# Patient Record
Sex: MALE | Race: OTHER
[De-identification: names, ages, dates, MRNs, and addresses within clinical notes are randomized per-mention and may not be internally consistent; named-entity substitution may affect disease eponyms.]

---

## 2018-11-20 PROBLEM — Z00.00 ENCOUNTER FOR PREVENTIVE HEALTH EXAMINATION: Status: ACTIVE | Noted: 2018-11-20

## 2018-11-23 ENCOUNTER — RECORD ABSTRACTING (OUTPATIENT)
Age: 63
End: 2018-11-23

## 2018-11-23 DIAGNOSIS — Z86.39 PERSONAL HISTORY OF OTHER ENDOCRINE, NUTRITIONAL AND METABOLIC DISEASE: ICD-10-CM

## 2018-11-23 DIAGNOSIS — Z86.69 PERSONAL HISTORY OF OTHER DISEASES OF THE NERVOUS SYSTEM AND SENSE ORGANS: ICD-10-CM

## 2018-11-23 DIAGNOSIS — Z87.19 PERSONAL HISTORY OF OTHER DISEASES OF THE DIGESTIVE SYSTEM: ICD-10-CM

## 2018-11-23 DIAGNOSIS — Z86.79 PERSONAL HISTORY OF OTHER DISEASES OF THE CIRCULATORY SYSTEM: ICD-10-CM

## 2018-11-23 DIAGNOSIS — Z83.79 FAMILY HISTORY OF OTHER DISEASES OF THE DIGESTIVE SYSTEM: ICD-10-CM

## 2018-11-23 DIAGNOSIS — Z78.9 OTHER SPECIFIED HEALTH STATUS: ICD-10-CM

## 2018-11-23 RX ORDER — BIMATOPROST 0.1 MG/ML
0.01 SOLUTION/ DROPS OPHTHALMIC
Refills: 0 | Status: ACTIVE | COMMUNITY

## 2018-11-23 RX ORDER — MERCAPTOPURINE 50 MG/1
50 TABLET ORAL
Refills: 0 | Status: ACTIVE | COMMUNITY

## 2018-11-23 RX ORDER — BUDESONIDE 9 MG/1
9 TABLET, EXTENDED RELEASE ORAL
Refills: 0 | Status: ACTIVE | COMMUNITY

## 2018-11-23 RX ORDER — HYDROCHLOROTHIAZIDE 25 MG/1
25 TABLET ORAL
Refills: 0 | Status: ACTIVE | COMMUNITY

## 2018-11-23 RX ORDER — LATANOPROST/PF 0.005 %
0.01 DROPS OPHTHALMIC (EYE)
Refills: 0 | Status: ACTIVE | COMMUNITY

## 2018-11-23 RX ORDER — DORZOLAMIDE HYDROCHLORIDE 20 MG/ML
2 SOLUTION OPHTHALMIC
Refills: 0 | Status: ACTIVE | COMMUNITY

## 2018-11-23 RX ORDER — BRIMONIDINE TARTRATE, TIMOLOL MALEATE 2; 5 MG/ML; MG/ML
0.2-0.5 SOLUTION/ DROPS OPHTHALMIC
Refills: 0 | Status: ACTIVE | COMMUNITY

## 2018-11-23 RX ORDER — LISINOPRIL 20 MG/1
20 TABLET ORAL
Refills: 0 | Status: ACTIVE | COMMUNITY

## 2018-11-30 ENCOUNTER — APPOINTMENT (OUTPATIENT)
Dept: GASTROENTEROLOGY | Facility: CLINIC | Age: 63
End: 2018-11-30
Payer: MEDICAID

## 2018-11-30 VITALS
HEART RATE: 49 BPM | SYSTOLIC BLOOD PRESSURE: 146 MMHG | HEIGHT: 64 IN | DIASTOLIC BLOOD PRESSURE: 90 MMHG | WEIGHT: 245 LBS | OXYGEN SATURATION: 96 % | BODY MASS INDEX: 41.83 KG/M2

## 2018-11-30 PROCEDURE — 99214 OFFICE O/P EST MOD 30 MIN: CPT | Mod: 25

## 2018-11-30 PROCEDURE — 36415 COLL VENOUS BLD VENIPUNCTURE: CPT

## 2018-12-03 LAB
BASOPHILS # BLD AUTO: 0.02 K/UL
BASOPHILS NFR BLD AUTO: 0.3 %
CRP SERPL-MCNC: 2.71 MG/DL
EOSINOPHIL # BLD AUTO: 0.17 K/UL
EOSINOPHIL NFR BLD AUTO: 2.7 %
ERYTHROCYTE [SEDIMENTATION RATE] IN BLOOD BY WESTERGREN METHOD: 20 MM/HR
HCT VFR BLD CALC: 45.7 %
HGB BLD-MCNC: 15.2 G/DL
IMM GRANULOCYTES NFR BLD AUTO: 0.2 %
LYMPHOCYTES # BLD AUTO: 1.28 K/UL
LYMPHOCYTES NFR BLD AUTO: 20.3 %
MAN DIFF?: NORMAL
MCHC RBC-ENTMCNC: 32.2 PG
MCHC RBC-ENTMCNC: 33.3 GM/DL
MCV RBC AUTO: 96.8 FL
MONOCYTES # BLD AUTO: 0.51 K/UL
MONOCYTES NFR BLD AUTO: 8.1 %
NEUTROPHILS # BLD AUTO: 4.3 K/UL
NEUTROPHILS NFR BLD AUTO: 68.4 %
PLATELET # BLD AUTO: 224 K/UL
RBC # BLD: 4.72 M/UL
RBC # FLD: 15.3 %
WBC # FLD AUTO: 6.29 K/UL

## 2019-01-04 ENCOUNTER — APPOINTMENT (OUTPATIENT)
Dept: GASTROENTEROLOGY | Facility: CLINIC | Age: 64
End: 2019-01-04
Payer: MEDICAID

## 2019-01-04 VITALS
DIASTOLIC BLOOD PRESSURE: 90 MMHG | BODY MASS INDEX: 41.48 KG/M2 | OXYGEN SATURATION: 95 % | HEART RATE: 53 BPM | WEIGHT: 243 LBS | HEIGHT: 64 IN | SYSTOLIC BLOOD PRESSURE: 156 MMHG

## 2019-01-04 PROCEDURE — 99214 OFFICE O/P EST MOD 30 MIN: CPT

## 2019-01-06 LAB
BASOPHILS # BLD AUTO: 0.02 K/UL
BASOPHILS NFR BLD AUTO: 0.5 %
CRP SERPL-MCNC: 0.44 MG/DL
EOSINOPHIL # BLD AUTO: 0.2 K/UL
EOSINOPHIL NFR BLD AUTO: 5.4 %
ERYTHROCYTE [SEDIMENTATION RATE] IN BLOOD BY WESTERGREN METHOD: 16 MM/HR
HCT VFR BLD CALC: 45.9 %
HGB BLD-MCNC: 15.1 G/DL
IMM GRANULOCYTES NFR BLD AUTO: 0.5 %
LYMPHOCYTES # BLD AUTO: 1.06 K/UL
LYMPHOCYTES NFR BLD AUTO: 28.8 %
MAN DIFF?: NORMAL
MCHC RBC-ENTMCNC: 32.9 GM/DL
MCHC RBC-ENTMCNC: 33 PG
MCV RBC AUTO: 100.4 FL
MONOCYTES # BLD AUTO: 0.19 K/UL
MONOCYTES NFR BLD AUTO: 5.2 %
NEUTROPHILS # BLD AUTO: 2.19 K/UL
NEUTROPHILS NFR BLD AUTO: 59.6 %
PLATELET # BLD AUTO: 273 K/UL
RBC # BLD: 4.57 M/UL
RBC # FLD: 17.2 %
WBC # FLD AUTO: 3.68 K/UL

## 2019-02-08 ENCOUNTER — APPOINTMENT (OUTPATIENT)
Dept: GASTROENTEROLOGY | Facility: CLINIC | Age: 64
End: 2019-02-08
Payer: MEDICAID

## 2019-02-08 VITALS
HEIGHT: 64 IN | HEART RATE: 51 BPM | OXYGEN SATURATION: 97 % | DIASTOLIC BLOOD PRESSURE: 80 MMHG | BODY MASS INDEX: 40.12 KG/M2 | SYSTOLIC BLOOD PRESSURE: 140 MMHG | WEIGHT: 235 LBS

## 2019-02-08 PROCEDURE — 99214 OFFICE O/P EST MOD 30 MIN: CPT

## 2019-02-08 NOTE — ADDENDUM
[FreeTextEntry1] : \par Imaging/Interpretations\par 10/12/10 CT ABD/PELV: Diffuse wall thickening \par \par \par Procedure\par 10/15/10: Colonoscopy: Moderate Pancolitis--1+edema,2-3+ granularity,2+ nodularity, 1-2+ erythema, No ulceration\par 3/19/13 Colonoscopy: sigmoid diverticulosis\par Asc:Tr Gran, TV:1+ Gran, Desc: 1-2+ Gran, 20 - 30cm flat polypoid lesions and thickened folds: Inflammatory \par 8/13/18 Colonoscopy: mild-mod patchy proctosigmoiditis--worse distal rectum, mod sigmoid diverticulosis, 0.5cm rectal polyp: HP, 2nd deg Hemorrhoids. \par

## 2019-02-08 NOTE — ASSESSMENT
[FreeTextEntry1] : \par \par 1. Ulcerative pancolitis without complication  : stable , No cramps, mucous/blood pr \par 10/5/17 w recent flare probably food borne, ESR=25, CRP=52--> 11/28/17: ESR=21, CRP=15-->12/21/17: ESR=22,CRP=39-->2/9/18: ESR=11, CRP<5 ->3/23/18: ESR=11,CRP=7-->\par 5/4/18: ESR=28, CRP=17-->7/31/18: ESR=38, CRP=9.7-->9/14/18: ESR=15, CRP<5-->10/12/18: ESR=11, CRP=6.2-->\par 11/9/18: ESR=13,  CRP=6--> 11/30/18: ESR=20, CRP=2.7--> 1/4/19: ESR=16, CRP=0.4\par Mild-Moderate Pancolitis-10/2010\par 3/2013: Sigmoid Inflammatory Polyps\par 8/13/18 Colonoscopy: mild-mod proctosigmoiditis--patchy\par 6 MP 50 mg qd: 8/23/18--> 75mg qd on 9/14/18--> 75mg alt w 100mg on 9/28/18-->100mg on 10/12/18-->100mg alt w 125mg on 11/11/18--> 125mg qd on 11/30 /18-->125mg alt w 150 on 1/4/19 : dose depends on cbc/er=922 kg\par Uceris 9mg qd tapered off--5/2015, restarted 9mg qd 12/25/17--> 3/23/18: 1 qod\par Lialda 4 qd--> Apriso 3 grm 2/2 Ins--~2/2/18\par Flax seed Oil 3, Probiotic 3\par check labs--cbc,esr,crp, \par 5/4/18: TPMT =26.6\par Colonoscopy--surveillance: 8/2020.   \par \par \par \par \par 2. Diverticulosis of large intestine without hemorrhage  \par well controlled, no pain, infection,bleeding\par Discussed the potential complications of perforation, pain, infection, bleeding.\par Moderate -Fiber Diet was reviewed and emphasized.\par 6 - 8 cups of decaffeinated fluid daily.   \par \par \par \par \par 3. Irritable bowel syndrome with diarrhea  \par stable, no pain , no diarrhea, no bloat\par Moderate-Fiber diet, Low fat & lactose-free, low FODMAPS,\par increase fluid intake, reg exercise,\par Probiotics 3 daily \par \par \par \par 4. Gastroesophageal reflux disease without esophagitis  :welll controlled, no ht burn, dysphagia, throat clear\par * No LPR, No Barretts w No Dysplasia , No Esophagitis grade: A \par *Anti-reflux diet and life-style changes emphasized. No Bedge. \par *Weight Reduction & reg exercise emphasized. \par * ++ PPI: Prilosec 20mg prn , No H2B Q HS: , No Carafate 1gm: \par * No F/U EGD: (_ )mo. / (_ )yrs, \par * No pH Monitor, No Manometry, No esophagram \par * No ENT eval., No Surgical eval.   \par \par \par \par \par 5. Iron deficiency anemia due to chronic blood loss  \par  stable, last Hgb in 1/2019--nl\par MVI, FA 1qd, Slo-Fe 1 qd\par check cbc.   \par \par \par \par \par 6. Internal hemorrhoids  \par well - controlled, no pain, swelling, itch, bleeding \par *Discussed the potential complications of thrombosis, pain, bleeding, swelling, itching, infection. \par *High-Fiber Diet was reviewed and emphasized. \par *6 -- 8 cups of decaffeinated fluid daily \par * Sitz Bathes BID, No: Anusol HC Suppos/Cream VA BID, \par * No: Tucks BID, No Balneol Lotion, No: Calmoseptine Oint, +++ Prep H prn \par * No: Colorectal Surgical evaluation for possible ablation.   \par \par \par \par

## 2019-02-08 NOTE — HISTORY OF PRESENT ILLNESS
[de-identified] : \par \par  PCP: elaina/daron \par \par        64 yo WM h/o HTN, Obesity, IBS, GERD\par        Adm [facility] 10/2010 w Crampy abd pain, Diarrhea, Heme +, Hgb=9, WBC=21K\par        CT ABD: Diffuse wall thickening\par        Colonoscopy: Moderate Pancolitis\par        Rx Prednisone 60mg qd taper, Asacol 4 tid\par        6/17/11 completed pred taper but continued Asacol 4 tid, anemia resolved\par        3/19/13 Colonoscopy: sigmoid divers, inflammatory polyps sigmoid colon\par        Asacol HD 3 tid + Rowasa q hs\par        Late Sept 2017: had fast food hamburger and several hrs later\par        Lower abd cramping, Loose BMs: #5, 2-3x/D, Blood in stool, No fever, chills\par        10/5/17: feeling better on Lialda 4 qd\par        BMS: 4-5, 2x/D, cramps and bleeding stopped\par        Labs: WBC=8.9, Hgb=15, MCV=90, IMGs=0.6%, ESR=25, CRP=52, CMET=wnl\par        11/28/17:on Lialda 4 qd, occas cramp, BMs:# 4-5, 2-3x/D, occas # 6\par        Labs: Hgb=15, ESR=21, CRP=15.8\par        12/21/17:yesterday pm, cramping, BMs: 5, 1-3 x/d, w blood and mucous\par        This am sl cramping, no blood but mucous\par        WBC=10, CRP=39, ESR=22\par        Uceris 9mg qd started on 12/25/17, Lialda--> Apriso 3grm 2/2 Ins coverage about 1 week ago\par        2/9/18: BMs: #4-5, 1-2 x D, ESR=11, CRP<5\par        3/23/18: BMs: #4, 2 x D,no pain brbpr, ESR=11, CRP=7.4\par        5/4/18: BMs: # 4 qd, no pain or blood, ESR=28, CRP=17\par        6/15/18: BMs:#4 qd, no pain or blood\par        7/31/18: was feeling well on Uceris qod, thinks had bug ~ 1 week ago w ? chill, sl cramping, 1 day brbpr/mucous then resolved.\par        ESR=38, CRP=9.9\par        8/13/18 Colonoscopy: mild-mod patchy proctosigmoiditis--worse distal rectum, mod sigmoid diverticulosis, 0.5cm rectal polyp: HP, 2nd deg Hemorrhoids.\par        8/23/18: 6 MP 50mg qd, 9/14/18: Hgb=14.9, MCV=94, ESR=15, CRP<5\par        9/28/18: 6MP 75mg qd, Hgb=16.7, MCV=96, ESR=11, CRP=6.2\par        11/9/18: 6MP 100mg qd, Hgb=16, MCV=94, ESR=13, CRP=6\par        11/30/18: 6MP 100mg qd alt w 125mg x  3 weeks, Hgb=15, MCV=96, ESR=20, CRP=2.7\par        1/4/18: 6MP 125mg \par \par        Today: 6MP 125mg qd alt w 150 x 4 weeks \par \par        BMs: # 4qd \par        blood-- no\par        mucous--no\par        Abd cramping-no\par        Diarrhea--no\par        Bloat-no \par        BRBPR-no\par        Htburn-no\par        Dysphagia-no \par        N/V--no\par        Early satiety--no\par        Belching--no\par        Constipation--no\par        Melena--no\par        Rash--no\par        Oral ulcers--no\par \par \par \par        PRIOR HISTORY:\par        12/20/13: 2 weeks diarrhea 4-5x/D, mild cramps, scant blood\par        WBC=29K, CRP=30, ESR=12, stool Cdiff--neg\par        Uceris 9, Asacol HD 3 tid\par        5/15/15: Uceris tapered off a week prior, Lialda 2/1/2; HGB=15.8, CRP=7, ESR=6\par        8/2015: Sinus infection\par        1/8/16 : Hgb=15.9, CRP=8.2, ESR=18, BMs:# 4qd, no pain, no brbpr\par        5/5/17:well on Lialda 4 qd , New Ins--dosent cover Lialda\par        BMs:# 4, 1-2x/D, CRP=10.6, ESR=23, CBC, CMET--wnl.

## 2019-02-09 LAB
CRP SERPL-MCNC: 0.7 MG/DL
ERYTHROCYTE [SEDIMENTATION RATE] IN BLOOD BY WESTERGREN METHOD: 20 MM/HR

## 2019-02-11 LAB
BASOPHILS # BLD AUTO: 0.02 K/UL
BASOPHILS NFR BLD AUTO: 0.4 %
EOSINOPHIL # BLD AUTO: 0.12 K/UL
EOSINOPHIL NFR BLD AUTO: 2.6 %
HCT VFR BLD CALC: 44.8 %
HGB BLD-MCNC: 15 G/DL
IMM GRANULOCYTES NFR BLD AUTO: 0.2 %
LYMPHOCYTES # BLD AUTO: 1.03 K/UL
LYMPHOCYTES NFR BLD AUTO: 22.3 %
MAN DIFF?: NORMAL
MCHC RBC-ENTMCNC: 33.5 GM/DL
MCHC RBC-ENTMCNC: 34.8 PG
MCV RBC AUTO: 103.9 FL
MONOCYTES # BLD AUTO: 0.17 K/UL
MONOCYTES NFR BLD AUTO: 3.7 %
NEUTROPHILS # BLD AUTO: 3.27 K/UL
NEUTROPHILS NFR BLD AUTO: 70.8 %
PLATELET # BLD AUTO: 253 K/UL
RBC # BLD: 4.31 M/UL
RBC # FLD: 17.1 %
WBC # FLD AUTO: 4.62 K/UL

## 2019-02-28 ENCOUNTER — LABORATORY RESULT (OUTPATIENT)
Age: 64
End: 2019-02-28

## 2019-02-28 ENCOUNTER — APPOINTMENT (OUTPATIENT)
Dept: GASTROENTEROLOGY | Facility: CLINIC | Age: 64
End: 2019-02-28
Payer: MEDICAID

## 2019-02-28 VITALS
WEIGHT: 234 LBS | HEIGHT: 65 IN | BODY MASS INDEX: 38.99 KG/M2 | DIASTOLIC BLOOD PRESSURE: 80 MMHG | SYSTOLIC BLOOD PRESSURE: 112 MMHG

## 2019-02-28 PROCEDURE — 99214 OFFICE O/P EST MOD 30 MIN: CPT

## 2019-02-28 RX ORDER — FAMOTIDINE 40 MG/1
40 TABLET, FILM COATED ORAL TWICE DAILY
Qty: 180 | Refills: 3 | Status: ACTIVE | COMMUNITY
Start: 2019-02-28 | End: 1900-01-01

## 2019-02-28 NOTE — ADDENDUM
Quality 226: Preventive Care And Screening: Tobacco Use: Screening And Cessation Intervention: Patient screened for tobacco use and is an ex/non-smoker [FreeTextEntry1] : \par Imaging/Interpretations\par 10/12/10 CT ABD/PELV: Diffuse wall thickening \par \par \par Procedure\par 10/15/10: Colonoscopy: Moderate Pancolitis--1+edema,2-3+ granularity,2+ nodularity, 1-2+ erythema, No ulceration\par 3/19/13 Colonoscopy: sigmoid diverticulosis\par Asc:Tr Gran, TV:1+ Gran, Desc: 1-2+ Gran, 20 - 30cm flat polypoid lesions and thickened folds: Inflammatory \par 8/13/18 Colonoscopy: mild-mod patchy proctosigmoiditis--worse distal rectum, mod sigmoid diverticulosis, 0.5cm rectal polyp: HP, 2nd deg Hemorrhoids. \par  Quality 111:Pneumonia Vaccination Status For Older Adults: Pneumococcal Vaccination Previously Received Detail Level: Detailed

## 2019-02-28 NOTE — HISTORY OF PRESENT ILLNESS
[de-identified] : \par \par  PCP: elaina/daron \par \par        62 yo WM h/o HTN, Obesity, IBS, GERD\par        Adm [facility] 10/2010 w Crampy abd pain, Diarrhea, Heme +, Hgb=9, WBC=21K\par        CT ABD: Diffuse wall thickening\par        Colonoscopy: Moderate Pancolitis\par        Rx Prednisone 60mg qd taper, Asacol 4 tid\par        6/17/11 completed pred taper but continued Asacol 4 tid, anemia resolved\par        3/19/13 Colonoscopy: sigmoid divers, inflammatory polyps sigmoid colon\par        Asacol HD 3 tid + Rowasa q hs\par        Late Sept 2017: had fast food hamburger and several hrs later\par        Lower abd cramping, Loose BMs: #5, 2-3x/D, Blood in stool, No fever, chills\par        10/5/17: feeling better on Lialda 4 qd\par        BMS: 4-5, 2x/D, cramps and bleeding stopped\par        Labs: WBC=8.9, Hgb=15, MCV=90, IMGs=0.6%, ESR=25, CRP=52, CMET=wnl\par        11/28/17:on Lialda 4 qd, occas cramp, BMs:# 4-5, 2-3x/D, occas # 6\par        Labs: Hgb=15, ESR=21, CRP=15.8\par        12/21/17:yesterday pm, cramping, BMs: 5, 1-3 x/d, w blood and mucous\par        This am sl cramping, no blood but mucous\par        WBC=10, CRP=39, ESR=22\par        Uceris 9mg qd started on 12/25/17, Lialda--> Apriso 3grm 2/2 Ins coverage about 1 week ago\par        2/9/18: BMs: #4-5, 1-2 x D, ESR=11, CRP<5\par        3/23/18: BMs: #4, 2 x D,no pain brbpr, ESR=11, CRP=7.4\par        5/4/18: BMs: # 4 qd, no pain or blood, ESR=28, CRP=17\par        6/15/18: BMs:#4 qd, no pain or blood\par        7/31/18: was feeling well on Uceris qod, thinks had bug ~ 1 week ago w ? chill, sl cramping, 1 day brbpr/mucous then resolved.\par        ESR=38, CRP=9.9\par        8/13/18 Colonoscopy: mild-mod patchy proctosigmoiditis--worse distal rectum, mod sigmoid diverticulosis, 0.5cm rectal polyp: HP, 2nd deg Hemorrhoids.\par        8/23/18: 6 MP 50mg qd, 9/14/18: Hgb=14.9, MCV=94, ESR=15, CRP<5\par        9/28/18: 6MP 75mg qd, Hgb=16.7, MCV=96, ESR=11, CRP=6.2\par        11/9/18: 6MP 100mg qd, Hgb=16, MCV=94, ESR=13, CRP=6\par        11/30/18: 6MP 100mg qd alt w 125mg x  3 weeks, Hgb=15, MCV=96, ESR=20, CRP=2.7\par        1/4/19: 6MP 125mg \par         2/8/19: Hgb=15, QAY=031, ESR=20, CRP=0.7 on 6MP 125mg qd alt w 150 x 4 weeks \par \par        Today:  last week had some nausea, no pain, feels malaise, no fever, chills, diarrhea\par        not that hungry, but eating\par        ate late the other  night, awoke w throat burn, cough--> regurg\par        Also some Nausea/churning of stomach during the day--felt better w pepcid\par        No fever, chills, change in stool, no rash, jt pain, pruritus\par        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, 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\par        BMs: # 4 qd, no blood or mucous\par \par        blood-- no\par        mucous--no\par        Abd cramping-no\par        Diarrhea--no\par        Bloat-no \par        BRBPR-no\par        Htburn-occas\par        Dysphagia-no \par        N/V--no\par        Early satiety--no\par        Belching--no\par        Constipation--no\par        Melena--no\par        Rash--no\par        Oral ulcers--no\par \par \par \par        PRIOR HISTORY:\par        12/20/13: 2 weeks diarrhea 4-5x/D, mild cramps, scant blood\par        WBC=29K, CRP=30, ESR=12, stool Cdiff--neg\par        Uceris 9, Asacol HD 3 tid\par        5/15/15: Uceris tapered off a week prior, Lialda 2/1/2; HGB=15.8, CRP=7, ESR=6\par        8/2015: Sinus infection\par        1/8/16 : Hgb=15.9, CRP=8.2, ESR=18, BMs:# 4qd, no pain, no brbpr\par        5/5/17:well on Lialda 4 qd , New Ins--dosent cover Lialda\par        BMs:# 4, 1-2x/D, CRP=10.6, ESR=23, CBC, CMET--wnl.

## 2019-02-28 NOTE — ASSESSMENT
[FreeTextEntry1] : \par Nausea: intermittent\par Assoc malaise, anorexia\par Regurg/ dyspepsia\par R/O Gastroenteritis\par        Hepatitis\par        GERD\par P: anti-reflux, lactose free, low fat\par    Pepcid 40 bid\par    No NDSAIDs/ETOH\par    Hold 6MP\par check: cbc, esr, crp, LFTs, sheila/lipase, metabolite levels\par \par \par \par \par 1. Ulcerative pancolitis without complication  : stable , No cramps, mucous/blood pr \par 10/5/17 w recent flare probably food borne, ESR=25, CRP=52--> 11/28/17: ESR=21, CRP=15-->12/21/17: ESR=22,CRP=39-->2/9/18: ESR=11, CRP<5 ->3/23/18: ESR=11,CRP=7-->\par 5/4/18: ESR=28, CRP=17-->7/31/18: ESR=38, CRP=9.7-->9/14/18: ESR=15, CRP<5-->10/12/18: ESR=11, CRP=6.2-->\par 11/9/18: ESR=13,  CRP=6--> 11/30/18: ESR=20, CRP=2.7--> 1/4/19: ESR=16, CRP=0.4-->2/8/19: ESR=20, CRP=0.7\par Mild-Moderate Pancolitis-10/2010\par 3/2013: Sigmoid Inflammatory Polyps\par 8/13/18 Colonoscopy: mild-mod proctosigmoiditis--patchy\par 6 MP 50 mg qd: 8/23/18--> 75mg qd on 9/14/18--> 75mg alt w 100mg on 9/28/18-->100mg on 10/12/18-->100mg alt w 125mg on 11/11/18--> 125mg qd on 11/30 /18-->125mg alt w 150 on 1/4/19--> 150mg--> hold \par Uceris 9mg qd tapered off--5/2015, restarted 9mg qd 12/25/17--> 3/23/18: 1 qod\par Lialda 4 qd--> Apriso 3 grm 2/2 Ins--~2/2/18\par Flax seed Oil 3, Probiotic 3\par check labs--cbc,esr,crp, \par 5/4/18: TPMT =26.6\par Colonoscopy--surveillance: 8/2020.   \par \par \par \par \par 2. Diverticulosis of large intestine without hemorrhage  \par well controlled, no pain, infection,bleeding\par Discussed the potential complications of perforation, pain, infection, bleeding.\par Moderate -Fiber Diet was reviewed and emphasized.\par 6 - 8 cups of decaffeinated fluid daily.   \par \par \par \par \par 3. Irritable bowel syndrome with diarrhea  \par stable, no pain , no diarrhea, no bloat\par Moderate-Fiber diet, Low fat & lactose-free, low FODMAPS,\par increase fluid intake, reg exercise,\par Probiotics 3 daily \par \par \par \par 4. Gastroesophageal reflux disease without esophagitis  :active w  ht burn, No dysphagia, throat clear\par * No LPR, No Barretts w No Dysplasia , No Esophagitis grade: A \par *Anti-reflux diet and life-style changes emphasized. No Bedge. \par *Weight Reduction & reg exercise emphasized. \par * ++ PPI: Prilosec 20mg prn--> 40mg bid  , No H2B Q HS: , No Carafate 1gm: \par * No F/U EGD: (_ )mo. / (_ )yrs, \par * No pH Monitor, No Manometry, No esophagram \par * No ENT eval., No Surgical eval.   \par \par \par \par \par 5. Iron deficiency anemia due to chronic blood loss  \par  stable, last Hgb in 2/2019--nl\par MVI, FA 1qd, Slo-Fe 1 qd\par check cbc.   \par \par \par \par \par 6. Internal hemorrhoids  \par well - controlled, no pain, swelling, itch, bleeding \par *Discussed the potential complications of thrombosis, pain, bleeding, swelling, itching, infection. \par *High-Fiber Diet was reviewed and emphasized. \par *6 -- 8 cups of decaffeinated fluid daily \par * Sitz Bathes BID, No: Anusol HC Suppos/Cream [place] BID, \par * No: Tucks BID, No Balneol Lotion, No: Calmoseptine Oint, +++ Prep H prn \par * No: Colorectal Surgical evaluation for possible ablation.   \par \par \par \par

## 2019-03-01 ENCOUNTER — LABORATORY RESULT (OUTPATIENT)
Age: 64
End: 2019-03-01

## 2019-03-01 LAB
ALBUMIN SERPL ELPH-MCNC: 3 G/DL
ALP BLD-CCNC: 124 U/L
ALT SERPL-CCNC: 124 U/L
AMYLASE/CREAT SERPL: 75 U/L
AST SERPL-CCNC: 90 U/L
BASOPHILS # BLD AUTO: 0.02 K/UL
BASOPHILS NFR BLD AUTO: 0.5 %
BILIRUB DIRECT SERPL-MCNC: 5.2 MG/DL
BILIRUB INDIRECT SERPL-MCNC: 2.1 MG/DL
BILIRUB SERPL-MCNC: 7.4 MG/DL
CRP SERPL-MCNC: 0.42 MG/DL
EOSINOPHIL # BLD AUTO: 0.1 K/UL
EOSINOPHIL NFR BLD AUTO: 2.4 %
ERYTHROCYTE [SEDIMENTATION RATE] IN BLOOD BY WESTERGREN METHOD: 9 MM/HR
HCT VFR BLD CALC: 42.3 %
HGB BLD-MCNC: 14.6 G/DL
IMM GRANULOCYTES NFR BLD AUTO: 0.5 %
LPL SERPL-CCNC: 49 U/L
LYMPHOCYTES # BLD AUTO: 0.81 K/UL
LYMPHOCYTES NFR BLD AUTO: 19.2 %
MAN DIFF?: NORMAL
MCHC RBC-ENTMCNC: 34.5 GM/DL
MCHC RBC-ENTMCNC: 36.9 PG
MCV RBC AUTO: 106.8 FL
MONOCYTES # BLD AUTO: 0.18 K/UL
MONOCYTES NFR BLD AUTO: 4.3 %
NEUTROPHILS # BLD AUTO: 3.09 K/UL
NEUTROPHILS NFR BLD AUTO: 73.1 %
PLATELET # BLD AUTO: 315 K/UL
PROT SERPL-MCNC: 5.3 G/DL
RBC # BLD: 3.96 M/UL
RBC # FLD: 17.6 %
WBC # FLD AUTO: 4.22 K/UL

## 2019-03-08 ENCOUNTER — LABORATORY RESULT (OUTPATIENT)
Age: 64
End: 2019-03-08

## 2019-03-18 ENCOUNTER — APPOINTMENT (OUTPATIENT)
Dept: FAMILY MEDICINE | Facility: CLINIC | Age: 64
End: 2019-03-18
Payer: MEDICAID

## 2019-03-18 ENCOUNTER — LABORATORY RESULT (OUTPATIENT)
Age: 64
End: 2019-03-18

## 2019-03-18 LAB
6-MMPN: NORMAL
6-TGN: 300
ALBUMIN SERPL ELPH-MCNC: 3.3 G/DL
ALBUMIN SERPL ELPH-MCNC: 3.4 G/DL
ALP BLD-CCNC: 132 U/L
ALP BLD-CCNC: 146 U/L
ALT SERPL-CCNC: 139 U/L
ALT SERPL-CCNC: 213 U/L
ANA PAT FLD IF-IMP: ABNORMAL
ANACR T: ABNORMAL
AST SERPL-CCNC: 104 U/L
AST SERPL-CCNC: 132 U/L
BASOPHILS # BLD AUTO: 0.06 K/UL
BASOPHILS NFR BLD AUTO: 1.7 %
BILIRUB DIRECT SERPL-MCNC: 1.6 MG/DL
BILIRUB DIRECT SERPL-MCNC: 6.3 MG/DL
BILIRUB INDIRECT SERPL-MCNC: 1 MG/DL
BILIRUB INDIRECT SERPL-MCNC: 2.6 MG/DL
BILIRUB SERPL-MCNC: 2.6 MG/DL
BILIRUB SERPL-MCNC: 9 MG/DL
CRP SERPL-MCNC: 0.42 MG/DL
EOSINOPHIL # BLD AUTO: 0.09 K/UL
EOSINOPHIL NFR BLD AUTO: 2.6 %
ERYTHROCYTE [SEDIMENTATION RATE] IN BLOOD BY WESTERGREN METHOD: 33 MM/HR
FERRITIN SERPL-MCNC: 921 NG/ML
GGT SERPL-CCNC: 655 U/L
GGT SERPL-CCNC: 719 U/L
HAV IGM SER QL: NONREACTIVE
HBV CORE IGM SER QL: NONREACTIVE
HBV CORE IGM SER QL: NONREACTIVE
HBV SURFACE AG SER QL: NONREACTIVE
HBV SURFACE AG SER QL: NONREACTIVE
HCT VFR BLD CALC: 36.6 %
HCV AB SER QL: NONREACTIVE
HCV AB SER QL: NONREACTIVE
HCV S/CO RATIO: 0.21 S/CO
HCV S/CO RATIO: 0.23 S/CO
HEPATITIS A IGG ANTIBODY: NONREACTIVE
HGB BLD-MCNC: 12.1 G/DL
LYMPHOCYTES # BLD AUTO: 1.06 K/UL
LYMPHOCYTES NFR BLD AUTO: 32.2 %
MAN DIFF?: NORMAL
MCHC RBC-ENTMCNC: 33.1 GM/DL
MCHC RBC-ENTMCNC: 36.9 PG
MCV RBC AUTO: 111.6 FL
MITOCHONDRIA AB SER IF-ACNC: NORMAL
MITOCHONDRIA AB SER IF-ACNC: NORMAL
MONOCYTES # BLD AUTO: 0.31 K/UL
MONOCYTES NFR BLD AUTO: 9.6 %
NEUTROPHILS # BLD AUTO: 1.6 K/UL
NEUTROPHILS NFR BLD AUTO: 47 %
PLATELET # BLD AUTO: 216 K/UL
PROT SERPL-MCNC: 5.6 G/DL
PROT SERPL-MCNC: 5.7 G/DL
RBC # BLD: 3.28 M/UL
RBC # FLD: 19.2 %
SMOOTH MUSCLE AB SER QL IF: ABNORMAL
TSH SERPL-ACNC: 1.38 UIU/ML
TSH SERPL-ACNC: 1.45 UIU/ML
WBC # FLD AUTO: 3.28 K/UL

## 2019-03-18 PROCEDURE — 36415 COLL VENOUS BLD VENIPUNCTURE: CPT

## 2019-03-19 LAB
ALBUMIN SERPL ELPH-MCNC: 3.8 G/DL
ALP BLD-CCNC: 103 U/L
ALT SERPL-CCNC: 111 U/L
AST SERPL-CCNC: 45 U/L
BASOPHILS # BLD AUTO: 0.07 K/UL
BASOPHILS NFR BLD AUTO: 0.8 %
BILIRUB DIRECT SERPL-MCNC: 0.6 MG/DL
BILIRUB INDIRECT SERPL-MCNC: 0.5 MG/DL
BILIRUB SERPL-MCNC: 1.2 MG/DL
CRP SERPL-MCNC: 0.14 MG/DL
EOSINOPHIL # BLD AUTO: 0 K/UL
EOSINOPHIL NFR BLD AUTO: 0 %
ERYTHROCYTE [SEDIMENTATION RATE] IN BLOOD BY WESTERGREN METHOD: 36 MM/HR
HCT VFR BLD CALC: 39.3 %
HGB BLD-MCNC: 13.7 G/DL
LYMPHOCYTES # BLD AUTO: 0.93 K/UL
LYMPHOCYTES NFR BLD AUTO: 10.8 %
MAN DIFF?: NORMAL
MCHC RBC-ENTMCNC: 34.9 GM/DL
MCHC RBC-ENTMCNC: 37.5 PG
MCV RBC AUTO: 107.7 FL
MONOCYTES # BLD AUTO: 0.86 K/UL
MONOCYTES NFR BLD AUTO: 10 %
NEUTROPHILS # BLD AUTO: 6.73 K/UL
NEUTROPHILS NFR BLD AUTO: 66.7 %
PLATELET # BLD AUTO: 341 K/UL
PROT SERPL-MCNC: 6.3 G/DL
RBC # BLD: 3.65 M/UL
RBC # FLD: 17.4 %
WBC # FLD AUTO: 8.58 K/UL

## 2019-03-22 ENCOUNTER — LABORATORY RESULT (OUTPATIENT)
Age: 64
End: 2019-03-22

## 2019-03-22 ENCOUNTER — APPOINTMENT (OUTPATIENT)
Dept: GASTROENTEROLOGY | Facility: CLINIC | Age: 64
End: 2019-03-22
Payer: MEDICAID

## 2019-03-22 VITALS
DIASTOLIC BLOOD PRESSURE: 76 MMHG | WEIGHT: 234 LBS | BODY MASS INDEX: 38.94 KG/M2 | OXYGEN SATURATION: 96 % | HEART RATE: 54 BPM | SYSTOLIC BLOOD PRESSURE: 130 MMHG

## 2019-03-22 PROCEDURE — 99214 OFFICE O/P EST MOD 30 MIN: CPT

## 2019-03-22 NOTE — ASSESSMENT
[FreeTextEntry1] : \par Nausea: intermittent--resolved\par Assoc malaise, anorexia--better\par Regurg/ dyspepsia--better\par Hepatitis: TB is trending down, transaminases 1-2x ULN\par 6 MP toxicity--d/c on 2/28\par Hep Serologies are neg; amylase/lipase--wnl\par some  GERD\par \par \par P: anti-reflux, lactose free, low fat\par    Pepcid 40 bid\par    No NDSAIDs/ETOH\par    Hold 6MP--> D/C \par check: cbc, esr, crp, LFTs,\par \par \par \par \par 1. Ulcerative pancolitis without complication  : stable , No cramps, mucous/blood pr \par 10/5/17 w recent flare probably food borne, ESR=25, CRP=52--> 11/28/17: ESR=21, CRP=15-->12/21/17: ESR=22,CRP=39-->2/9/18: ESR=11, CRP<5 ->3/23/18: ESR=11,CRP=7-->\par 5/4/18: ESR=28, CRP=17-->7/31/18: ESR=38, CRP=9.7-->9/14/18: ESR=15, CRP<5-->10/12/18: ESR=11, CRP=6.2-->\par 11/9/18: ESR=13,  CRP=6--> 11/30/18: ESR=20, CRP=2.7--> 1/4/19: ESR=16, CRP=0.4-->2/8/19: ESR=20, CRP=0.7-->3/8/19: CRP=0.42--> 3/18/19: CRP=.14, ESR=36\par Mild-Moderate Pancolitis-10/2010\par 3/2013: Sigmoid Inflammatory Polyps\par 8/13/18 Colonoscopy: mild-mod proctosigmoiditis--patchy\par 6 MP 50 mg qd: 8/23/18--> 75mg qd on 9/14/18--> 75mg alt w 100mg on 9/28/18-->100mg on 10/12/18-->100mg alt w 125mg on 11/11/18--> 125mg qd on 11/30 /18-->125mg alt w 150 on 1/4/19--> 150mg--> d/c\par Will need Humira, once LFTs normalize--discussed w pt, wants to wait & watch w inflam marker monitoring\par Uceris 9mg qd tapered off--5/2015, restarted 9mg qd 12/25/17--> 3/23/18: 1 qod\par Lialda 4 qd--> Apriso 3 grm 2/2 Ins--~2/2/18\par Flax seed Oil 3, Probiotic 3\par check labs--cbc,esr,crp, \par 5/4/18: TPMT =26.6\par Colonoscopy--surveillance: 8/2020.   \par \par \par \par \par 2. Diverticulosis of large intestine w/o  hemorrhage  :well controlled, no pain, infection,bleeding\par Discussed the potential complications of perforation, pain, infection, bleeding.\par Moderate -Fiber Diet was reviewed and emphasized.\par 6 - 8 cups of decaffeinated fluid daily.   \par \par \par \par \par 3. Irritable bowel syndrome with diarrhea : stable, no pain , no diarrhea, no bloat\par Moderate-Fiber diet, Low fat & lactose-free, low FODMAPS,\par increase fluid intake, reg exercise,\par Probiotics 3 daily \par \par \par \par \par 4. Gastroesophageal reflux disease w/o  esophagitis  :better w less  ht burn, No dysphagia, throat clear\par * No LPR, No Barretts w No Dysplasia , No Esophagitis grade: A \par *Anti-reflux diet and life-style changes emphasized. No Bedge. \par *Weight Reduction & reg exercise emphasized. \par * ++ PPI: Prilosec 20mg prn--> 40mg bid--> 40mg qam   , No H2B Q HS: , No Carafate 1gm: \par * No F/U EGD: (_ )mo. / (_ )yrs, \par * No pH Monitor, No Manometry, No esophagram \par * No ENT eval., No Surgical eval.   \par \par \par \par \par 5. Iron deficiency anemia due to chronic blood loss  \par  stable, last Hgb in 3/2019 = 13\par MVI, FA 1qd, Slo-Fe 1 qd\par check cbc.   \par \par \par \par \par 6. Internal hemorrhoids  :well - controlled, no pain, swelling, itch, bleeding \par *Discussed the potential complications of thrombosis, pain, bleeding, swelling, itching, infection. \par *High-Fiber Diet was reviewed and emphasized. \par *6 -- 8 cups of decaffeinated fluid daily \par * Sitz Bathes BID, No: Anusol HC Suppos/Cream [place] BID, \par * No: Tucks BID, No Balneol Lotion, No: Calmoseptine Oint, +++ Prep H prn \par * No: Colorectal Surgical evaluation for possible ablation.   \par \par \par \par

## 2019-03-22 NOTE — HISTORY OF PRESENT ILLNESS
[de-identified] : \par \par  PCP: elaina/daron \par \par        62 yo WM h/o HTN, Obesity, IBS, GERD\par        Adm [facility] 10/2010 w Crampy abd pain, Diarrhea, Heme +, Hgb=9, WBC=21K\par        CT ABD: Diffuse wall thickening\par        Colonoscopy: Moderate Pancolitis\par        Rx Prednisone 60mg qd taper, Asacol 4 tid\par        6/17/11 completed pred taper but continued Asacol 4 tid, anemia resolved\par        3/19/13 Colonoscopy: sigmoid divers, inflammatory polyps sigmoid colon\par        Asacol HD 3 tid + Rowasa q hs\par        Late Sept 2017: had fast food hamburger and several hrs later\par        Lower abd cramping, Loose BMs: #5, 2-3x/D, Blood in stool, No fever, chills\par        10/5/17: feeling better on Lialda 4 qd\par        BMS: 4-5, 2x/D, cramps and bleeding stopped\par        Labs: WBC=8.9, Hgb=15, MCV=90, IMGs=0.6%, ESR=25, CRP=52, CMET=wnl\par        11/28/17:on Lialda 4 qd, occas cramp, BMs:# 4-5, 2-3x/D, occas # 6\par        Labs: Hgb=15, ESR=21, CRP=15.8\par        12/21/17:yesterday pm, cramping, BMs: 5, 1-3 x/d, w blood and mucous\par        This am sl cramping, no blood but mucous\par        WBC=10, CRP=39, ESR=22\par        Uceris 9mg qd started on 12/25/17, Lialda--> Apriso 3grm 2/2 Ins coverage about 1 week ago\par        2/9/18: BMs: #4-5, 1-2 x D, ESR=11, CRP<5\par        3/23/18: BMs: #4, 2 x D,no pain brbpr, ESR=11, CRP=7.4\par        5/4/18: BMs: # 4 qd, no pain or blood, ESR=28, CRP=17\par        6/15/18: BMs:#4 qd, no pain or blood\par        7/31/18: was feeling well on Uceris qod, thinks had bug ~ 1 week ago w ? chill, sl cramping, 1 day brbpr/mucous then resolved.\par        ESR=38, CRP=9.9\par        8/13/18 Colonoscopy: mild-mod patchy proctosigmoiditis--worse distal rectum, mod sigmoid diverticulosis, 0.5cm rectal polyp: HP, 2nd deg Hemorrhoids.\par        8/23/18: 6 MP 50mg qd, 9/14/18: Hgb=14.9, MCV=94, ESR=15, CRP<5\par        9/28/18: 6MP 75mg qd, Hgb=16.7, MCV=96, ESR=11, CRP=6.2\par        11/9/18: 6MP 100mg qd, Hgb=16, MCV=94, ESR=13, CRP=6\par        11/30/18: 6MP 100mg qd alt w 125mg x  3 weeks, Hgb=15, MCV=96, ESR=20, CRP=2.7\par        1/4/19: 6MP 125mg \par         2/8/19: Hgb=15, XWN=803, ESR=20, CRP=0.7 on 6MP 125mg qd alt w 150 x 4 weeks \par         2/28/19:last wk some nausea, no pain, + malaise, no fever/chills/diarrhea, not hungry, \par        ate late the other night, awoke w throat burn, cough--> regurg\par        Also some Nausea/churning of stomach during the day--felt better w pepcid\par        Nochange in stool, no rash, jt pain, pruritus,new travel, recent contact w sick students--\par        BMs: # 4 qd, no blood or mucous;  6-MP held \par        Labs: TB=7.4, CAU=362, ALT=124,AST=90, CRP=0.42,ESR=9, Hgb=14\par        3/1/19: TB=9,LUX=647,ALT=139, ESR=33, NEREYDA,ASMA,AMA,HCV,HAV,HBV--all neg\par        3/8/19: Feeling better, less nausea, eating, TB=2.6, HFQ=197, ALT=213, BLG=665, CRP=.42\par        3/18/19: TB=1.2, ELG=566, ALT=111, AST=45, ESR=36, CRP=0.14\par \par Today: feeling well, no pain, N, diarrhea\par       BMs: # 4 qd, ay=497\par        blood-- no\par        mucous--no\par        Abd cramping-no\par        Diarrhea--no\par        Bloat-no \par        BRBPR-no\par        Htburn-occas\par        Dysphagia-no \par        N/V--no\par        Early satiety--no\par        Belching--no\par        Constipation--no\par        Melena--no\par        Rash--no\par        Oral ulcers--no\par \par \par \par        PRIOR HISTORY:\par        12/20/13: 2 weeks diarrhea 4-5x/D, mild cramps, scant blood\par        WBC=29K, CRP=30, ESR=12, stool Cdiff--neg\par        Uceris 9, Asacol HD 3 tid\par        5/15/15: Uceris tapered off a week prior, Lialda 2/1/2; HGB=15.8, CRP=7, ESR=6\par        8/2015: Sinus infection\par        1/8/16 : Hgb=15.9, CRP=8.2, ESR=18, BMs:# 4qd, no pain, no brbpr\par        5/5/17:well on Lialda 4 qd , New Ins--dosent cover Lialda\par        BMs:# 4, 1-2x/D, CRP=10.6, ESR=23, CBC, CMET--wnl.

## 2019-03-23 LAB
ALBUMIN SERPL ELPH-MCNC: 3.7 G/DL
ALP BLD-CCNC: 85 U/L
ALT SERPL-CCNC: 70 U/L
AST SERPL-CCNC: 38 U/L
BASOPHILS # BLD AUTO: 0.1 K/UL
BASOPHILS NFR BLD AUTO: 0.9 %
BILIRUB DIRECT SERPL-MCNC: 0.5 MG/DL
BILIRUB INDIRECT SERPL-MCNC: 0.5 MG/DL
BILIRUB SERPL-MCNC: 0.9 MG/DL
CRP SERPL-MCNC: 0.21 MG/DL
EOSINOPHIL # BLD AUTO: 0.13 K/UL
EOSINOPHIL NFR BLD AUTO: 1.2 %
ERYTHROCYTE [SEDIMENTATION RATE] IN BLOOD BY WESTERGREN METHOD: 28 MM/HR
GGT SERPL-CCNC: 191 U/L
HCT VFR BLD CALC: 38.5 %
HGB BLD-MCNC: 13 G/DL
IMM GRANULOCYTES NFR BLD AUTO: 3 %
LYMPHOCYTES # BLD AUTO: 2.86 K/UL
LYMPHOCYTES NFR BLD AUTO: 26.7 %
MAN DIFF?: NORMAL
MCHC RBC-ENTMCNC: 33.8 GM/DL
MCHC RBC-ENTMCNC: 37.2 PG
MCV RBC AUTO: 110.3 FL
MONOCYTES # BLD AUTO: 1.33 K/UL
MONOCYTES NFR BLD AUTO: 12.4 %
NEUTROPHILS # BLD AUTO: 5.97 K/UL
NEUTROPHILS NFR BLD AUTO: 55.8 %
PLATELET # BLD AUTO: 252 K/UL
PROT SERPL-MCNC: 5.8 G/DL
RBC # BLD: 3.49 M/UL
RBC # FLD: 17.6 %
WBC # FLD AUTO: 10.71 K/UL

## 2019-03-27 ENCOUNTER — RX RENEWAL (OUTPATIENT)
Age: 64
End: 2019-03-27

## 2019-04-03 ENCOUNTER — MOBILE ON CALL (OUTPATIENT)
Age: 64
End: 2019-04-03

## 2019-04-22 ENCOUNTER — RX RENEWAL (OUTPATIENT)
Age: 64
End: 2019-04-22

## 2019-04-26 ENCOUNTER — LABORATORY RESULT (OUTPATIENT)
Age: 64
End: 2019-04-26

## 2019-04-26 ENCOUNTER — APPOINTMENT (OUTPATIENT)
Dept: GASTROENTEROLOGY | Facility: CLINIC | Age: 64
End: 2019-04-26
Payer: MEDICAID

## 2019-04-26 VITALS
SYSTOLIC BLOOD PRESSURE: 138 MMHG | BODY MASS INDEX: 38.99 KG/M2 | HEART RATE: 58 BPM | HEIGHT: 65 IN | DIASTOLIC BLOOD PRESSURE: 90 MMHG | WEIGHT: 234 LBS

## 2019-04-26 PROCEDURE — 99214 OFFICE O/P EST MOD 30 MIN: CPT

## 2019-04-26 NOTE — ASSESSMENT
[FreeTextEntry1] : \par Nausea: intermittent--resolved\par Assoc malaise, anorexia--better\par Regurg/ dyspepsia--better\par Hepatitis: TB is trending down, transaminases are normal\par 6 MP toxicity--d/c on 2/28\par Hep Serologies are neg; amylase/lipase--wnl\par some  GERD\par \par \par P: anti-reflux, lactose free, low fat\par    Pepcid 40 bid--> bid  alt w 1 qd \par    No NDSAIDs/ETOH\par    Hold 6MP--> D/C \par check: cbc, esr, crp, LFTs,\par \par \par \par \par 1. Ulcerative pancolitis without complication  : stable , No cramps, mucous/blood pr \par 10/5/17 w recent flare probably food borne, ESR=25, CRP=52--> 11/28/17: ESR=21, CRP=15-->12/21/17: ESR=22,CRP=39-->2/9/18: ESR=11, CRP<5 ->3/23/18: ESR=11,CRP=7-->\par 5/4/18: ESR=28, CRP=17-->7/31/18: ESR=38, CRP=9.7-->9/14/18: ESR=15, CRP<5-->10/12/18: ESR=11, CRP=6.2-->\par 11/9/18: ESR=13,  CRP=6--> 11/30/18: ESR=20, CRP=2.7--> 1/4/19: ESR=16, CRP=0.4-->2/8/19: ESR=20, CRP=0.7-->3/8/19: CRP=0.42--> 3/18/19: CRP=.14, ESR=36-->3/22/19: CRP=0.21, ESR=28\par Mild-Moderate Pancolitis-10/2010\par 3/2013: Sigmoid Inflammatory Polyps\par 8/13/18 Colonoscopy: mild-mod proctosigmoiditis--patchy\par 6 MP 50 mg qd: 8/23/18--> 75mg qd on 9/14/18--> 75mg alt w 100mg on 9/28/18-->100mg on 10/12/18-->100mg alt w 125mg on 11/11/18--> 125mg qd on 11/30 /18-->125mg alt w 150 on 1/4/19--> 150mg--> d/c\par Will need Humira, once LFTs normalize--discussed w pt, wants to wait & watch w inflam marker monitoring\par Uceris 9mg qd tapered off--5/2015, restarted 9mg qd 12/25/17--> 3/23/18: 1 qod\par Lialda 4 qd--> Apriso 3 grm 2/2 Ins--~2/2/18\par Flax seed Oil 3, Probiotic 3\par check labs--cbc,esr,crp, \par 5/4/18: TPMT =26.6\par Colonoscopy--surveillance: 8/2020.   \par \par \par \par \par 2. Diverticulosis of large intestine w/o  hemorrhage  :well controlled, no pain, infection,bleeding\par Discussed the potential complications of perforation, pain, infection, bleeding.\par Moderate -Fiber Diet was reviewed and emphasized.\par 6 - 8 cups of decaffeinated fluid daily.   \par \par \par \par \par 3. Irritable bowel syndrome with diarrhea : stable, no pain , no diarrhea, no bloat\par Moderate-Fiber diet, Low fat & lactose-free, low FODMAPS,\par increase fluid intake, reg exercise,\par Probiotics 3 daily \par \par \par \par \par 4. Gastroesophageal reflux disease w/o  esophagitis  :better w less  ht burn, No dysphagia, throat clear\par * No LPR, No Barretts w No Dysplasia , No Esophagitis grade: A \par *Anti-reflux diet and life-style changes emphasized. No Bedge. \par *Weight Reduction & reg exercise emphasized. \par * ++ PPI: Prilosec 20mg prn--> 40mg bid--> 40mg qam   , No H2B Q HS: , No Carafate 1gm: \par * No F/U EGD: (_ )mo. / (_ )yrs, \par * No pH Monitor, No Manometry, No esophagram \par * No ENT eval., No Surgical eval.   \par \par \par \par \par 5. Iron deficiency anemia due to chronic blood loss  \par  stable, last Hgb in 3/2019 = 13\par MVI, FA 1qd, Slo-Fe 1 qd\par check cbc.   \par \par \par \par \par 6. Internal hemorrhoids  :well - controlled, no pain, swelling, itch, bleeding \par *Discussed the potential complications of thrombosis, pain, bleeding, swelling, itching, infection. \par *High-Fiber Diet was reviewed and emphasized. \par *6 -- 8 cups of decaffeinated fluid daily \par * Sitz Bathes BID, No: Anusol HC Suppos/Cream [place] BID, \par * No: Tucks BID, No Balneol Lotion, No: Calmoseptine Oint, +++ Prep H prn \par * No: Colorectal Surgical evaluation for possible ablation.   \par \par \par \par

## 2019-04-26 NOTE — HISTORY OF PRESENT ILLNESS
[de-identified] : \par \par  PCP: elaina/daron \par \par        64 yo WM h/o HTN, Obesity, IBS, GERD\par        Adm [facility] 10/2010 w Crampy abd pain, Diarrhea, Heme +, Hgb=9, WBC=21K\par        CT ABD: Diffuse wall thickening\par        Colonoscopy: Moderate Pancolitis\par        Rx Prednisone 60mg qd taper, Asacol 4 tid\par        6/17/11 completed pred taper but continued Asacol 4 tid, anemia resolved\par        3/19/13 Colonoscopy: sigmoid divers, inflammatory polyps sigmoid colon\par        Asacol HD 3 tid + Rowasa q hs\par        Late Sept 2017: had fast food hamburger and several hrs later\par        Lower abd cramping, Loose BMs: #5, 2-3x/D, Blood in stool, No fever, chills\par        10/5/17: feeling better on Lialda 4 qd\par        BMS: 4-5, 2x/D, cramps and bleeding stopped\par        Labs: WBC=8.9, Hgb=15, MCV=90, IMGs=0.6%, ESR=25, CRP=52, CMET=wnl\par        11/28/17:on Lialda 4 qd, occas cramp, BMs:# 4-5, 2-3x/D, occas # 6\par        Labs: Hgb=15, ESR=21, CRP=15.8\par        12/21/17:yesterday pm, cramping, BMs: 5, 1-3 x/d, w blood and mucous\par        This am sl cramping, no blood but mucous\par        WBC=10, CRP=39, ESR=22\par        Uceris 9mg qd started on 12/25/17, Lialda--> Apriso 3grm 2/2 Ins coverage about 1 week ago\par        2/9/18: BMs: #4-5, 1-2 x D, ESR=11, CRP<5\par        3/23/18: BMs: #4, 2 x D,no pain brbpr, ESR=11, CRP=7.4\par        5/4/18: BMs: # 4 qd, no pain or blood, ESR=28, CRP=17\par        6/15/18: BMs:#4 qd, no pain or blood\par        7/31/18: was feeling well on Uceris qod, thinks had bug ~ 1 week ago w ? chill, sl cramping, 1 day brbpr/mucous then resolved.\par        ESR=38, CRP=9.9\par        8/13/18 Colonoscopy: mild-mod patchy proctosigmoiditis--worse distal rectum, mod sigmoid diverticulosis, 0.5cm rectal polyp: HP, 2nd deg Hemorrhoids.\par        8/23/18: 6 MP 50mg qd, 9/14/18: Hgb=14.9, MCV=94, ESR=15, CRP<5\par        9/28/18: 6MP 75mg qd, Hgb=16.7, MCV=96, ESR=11, CRP=6.2\par        11/9/18: 6MP 100mg qd, Hgb=16, MCV=94, ESR=13, CRP=6\par        11/30/18: 6MP 100mg qd alt w 125mg x  3 weeks, Hgb=15, MCV=96, ESR=20, CRP=2.7\par        1/4/19: 6MP 125mg \par         2/8/19: Hgb=15, QBW=794, ESR=20, CRP=0.7 on 6MP 125mg qd alt w 150 x 4 weeks \par         2/28/19:last wk some nausea, no pain, + malaise, no fever/chills/diarrhea, not hungry, \par        ate late the other night, awoke w throat burn, cough--> regurg\par        Also some Nausea/churning of stomach during the day--felt better w pepcid\par        Nochange in stool, no rash, jt pain, pruritus,new travel, recent contact w sick students--\par        BMs: # 4 qd, no blood or mucous;  6-MP held \par        Labs: TB=7.4, ELV=360, ALT=124,AST=90, CRP=0.42,ESR=9, Hgb=14\par        3/1/19: TB=9,WFI=297,ALT=139, ESR=33, NEREYDA,ASMA,AMA,HCV,HAV,HBV--all neg\par        3/8/19: Feeling better, less nausea, eating, TB=2.6, WLG=788, ALT=213, DIC=537, CRP=.42\par        3/18/19: TB=1.2, PUC=290, ALT=111, AST=45, ESR=36, CRP=0.14\par        3/22/19: TB=0.9, ALP=85, ALT=70, AST=38, ESR=28, CRP=0.21;  BMs: # 4 qd, hq=669\par \par \par Today: feeling well, no pain, N, diarrhea\par       BMs: # 4 qd, rf=350\par        blood-- no\par        mucous--no\par        Abd cramping-no\par        Diarrhea--no\par        Bloat-no \par        BRBPR-no\par        Htburn-no\par        Dysphagia-no \par        N/V--no\par        Early satiety--no\par        Belching--no\par        Constipation--no\par        Melena--no\par        Rash--no\par        Oral ulcers--no\par \par \par \par        PRIOR HISTORY:\par        12/20/13: 2 weeks diarrhea 4-5x/D, mild cramps, scant blood\par        WBC=29K, CRP=30, ESR=12, stool Cdiff--neg\par        Uceris 9, Asacol HD 3 tid\par        5/15/15: Uceris tapered off a week prior, Lialda 2/1/2; HGB=15.8, CRP=7, ESR=6\par        8/2015: Sinus infection\par        1/8/16 : Hgb=15.9, CRP=8.2, ESR=18, BMs:# 4qd, no pain, no brbpr\par        5/5/17:well on Lialda 4 qd , New Ins--dosent cover Lialda\par        BMs:# 4, 1-2x/D, CRP=10.6, ESR=23, CBC, CMET--wnl.

## 2019-04-27 LAB
ALBUMIN SERPL ELPH-MCNC: 4 G/DL
ALP BLD-CCNC: 58 U/L
ALT SERPL-CCNC: 25 U/L
AST SERPL-CCNC: 19 U/L
BASOPHILS # BLD AUTO: 0.09 K/UL
BASOPHILS NFR BLD AUTO: 0.9 %
BILIRUB DIRECT SERPL-MCNC: 0.2 MG/DL
BILIRUB INDIRECT SERPL-MCNC: 0.3 MG/DL
BILIRUB SERPL-MCNC: 0.5 MG/DL
CRP SERPL-MCNC: 0.68 MG/DL
EOSINOPHIL # BLD AUTO: 0 K/UL
EOSINOPHIL NFR BLD AUTO: 0 %
ERYTHROCYTE [SEDIMENTATION RATE] IN BLOOD BY WESTERGREN METHOD: 46 MM/HR
HCT VFR BLD CALC: 39.2 %
HGB BLD-MCNC: 12.9 G/DL
LYMPHOCYTES # BLD AUTO: 1.35 K/UL
LYMPHOCYTES NFR BLD AUTO: 13.2 %
MAN DIFF?: NORMAL
MCHC RBC-ENTMCNC: 32.9 GM/DL
MCHC RBC-ENTMCNC: 36.5 PG
MCV RBC AUTO: 111 FL
MONOCYTES # BLD AUTO: 0.54 K/UL
MONOCYTES NFR BLD AUTO: 5.3 %
NEUTROPHILS # BLD AUTO: 8.06 K/UL
NEUTROPHILS NFR BLD AUTO: 78.9 %
PLATELET # BLD AUTO: 184 K/UL
PROT SERPL-MCNC: 6.6 G/DL
RBC # BLD: 3.53 M/UL
RBC # FLD: 15.2 %
WBC # FLD AUTO: 10.21 K/UL

## 2019-06-03 ENCOUNTER — RX RENEWAL (OUTPATIENT)
Age: 64
End: 2019-06-03

## 2019-06-07 ENCOUNTER — APPOINTMENT (OUTPATIENT)
Dept: GASTROENTEROLOGY | Facility: CLINIC | Age: 64
End: 2019-06-07
Payer: MEDICAID

## 2019-06-07 VITALS
HEART RATE: 51 BPM | SYSTOLIC BLOOD PRESSURE: 130 MMHG | HEIGHT: 65 IN | BODY MASS INDEX: 38.99 KG/M2 | WEIGHT: 234 LBS | DIASTOLIC BLOOD PRESSURE: 90 MMHG

## 2019-06-07 PROCEDURE — 99214 OFFICE O/P EST MOD 30 MIN: CPT

## 2019-06-07 NOTE — HISTORY OF PRESENT ILLNESS
[de-identified] : \par \par  PCP: elaina/daron \par \par        64 yo WM h/o HTN, Obesity, IBS, GERD\par        Adm [facility] 10/2010 w Crampy abd pain, Diarrhea, Heme +, Hgb=9, WBC=21K\par        CT ABD: Diffuse wall thickening\par        Colonoscopy: Moderate Pancolitis\par        Rx Prednisone 60mg qd taper, Asacol 4 tid\par        6/17/11 completed pred taper but continued Asacol 4 tid, anemia resolved\par        3/19/13 Colonoscopy: sigmoid divers, inflammatory polyps sigmoid colon\par        Asacol HD 3 tid + Rowasa q hs\par        Late Sept 2017: had fast food hamburger and several hrs later\par        Lower abd cramping, Loose BMs: #5, 2-3x/D, Blood in stool, No fever, chills\par        10/5/17: feeling better on Lialda 4 qd\par        BMS: 4-5, 2x/D, cramps and bleeding stopped\par        Labs: WBC=8.9, Hgb=15, MCV=90, IMGs=0.6%, ESR=25, CRP=52, CMET=wnl\par        11/28/17:on Lialda 4 qd, occas cramp, BMs:# 4-5, 2-3x/D, occas # 6\par        Labs: Hgb=15, ESR=21, CRP=15.8\par        12/21/17:yesterday pm, cramping, BMs: 5, 1-3 x/d, w blood and mucous\par        This am sl cramping, no blood but mucous\par        WBC=10, CRP=39, ESR=22\par        Uceris 9mg qd started on 12/25/17, Lialda--> Apriso 3grm 2/2 Ins coverage about 1 week ago\par        2/9/18: BMs: #4-5, 1-2 x D, ESR=11, CRP<5\par        3/23/18: BMs: #4, 2 x D,no pain brbpr, ESR=11, CRP=7.4\par        5/4/18: BMs: # 4 qd, no pain or blood, ESR=28, CRP=17\par        6/15/18: BMs:#4 qd, no pain or blood\par        7/31/18: was feeling well on Uceris qod, thinks had bug ~ 1 week ago w ? chill, sl cramping, 1 day brbpr/mucous then resolved.\par        ESR=38, CRP=9.9\par        8/13/18 Colonoscopy: mild-mod patchy proctosigmoiditis--worse distal rectum, mod sigmoid diverticulosis, 0.5cm rectal polyp: HP, 2nd deg Hemorrhoids.\par        8/23/18: 6 MP 50mg qd, 9/14/18: Hgb=14.9, MCV=94, ESR=15, CRP<5\par        9/28/18: 6MP 75mg qd, Hgb=16.7, MCV=96, ESR=11, CRP=6.2\par        11/9/18: 6MP 100mg qd, Hgb=16, MCV=94, ESR=13, CRP=6\par        11/30/18: 6MP 100mg qd alt w 125mg x  3 weeks, Hgb=15, MCV=96, ESR=20, CRP=2.7\par        1/4/19: 6MP 125mg \par         2/8/19: Hgb=15, OGL=345, ESR=20, CRP=0.7 on 6MP 125mg qd alt w 150 x 4 weeks \par         2/28/19:last wk some nausea, no pain, + malaise, no fever/chills/diarrhea, not hungry, \par        ate late the other night, awoke w throat burn, cough--> regurg\par        Also some Nausea/churning of stomach during the day--felt better w pepcid\par        Nochange in stool, no rash, jt pain, pruritus,new travel, recent contact w sick students--\par        BMs: # 4 qd, no blood or mucous;  6-MP held \par        Labs: TB=7.4, RKD=833, ALT=124,AST=90, CRP=0.42,ESR=9, Hgb=14\par        3/1/19: TB=9,DEG=375,ALT=139, ESR=33, NEREYDA,ASMA,AMA,HCV,HAV,HBV--all neg\par        3/8/19: Feeling better, less nausea, eating, TB=2.6, VQX=735, ALT=213, ZLU=616, CRP=.42\par        3/18/19: TB=1.2, JYA=186, ALT=111, AST=45, ESR=36, CRP=0.14\par        3/22/19: TB=0.9, ALP=85, ALT=70, AST=38, ESR=28, CRP=0.21;  BMs: # 4 qd, rh=621\par        4/26/19: TB=0.5, ALP=58, ALT=25,AST=19, ESRE=46, CRP=0.6, BMs: #4qd, du=567\par \par Today: feeling well, no pain, N, diarrhea\par       BMs: # 4 qd, pr=398\par        blood-- no\par        mucous--no\par        Abd cramping-no\par        Diarrhea--no\par        Bloat-no \par        BRBPR-no\par        Htburn-no\par        Dysphagia-no \par        N/V--no\par        Early satiety--no\par        Belching--no\par        Constipation--no\par        Melena--no\par        Rash--no\par        Oral ulcers--no\par \par \par \par        PRIOR HISTORY:\par        12/20/13: 2 weeks diarrhea 4-5x/D, mild cramps, scant blood\par        WBC=29K, CRP=30, ESR=12, stool Cdiff--neg\par        Uceris 9, Asacol HD 3 tid\par        5/15/15: Uceris tapered off a week prior, Lialda 2/1/2; HGB=15.8, CRP=7, ESR=6\par        8/2015: Sinus infection\par        1/8/16 : Hgb=15.9, CRP=8.2, ESR=18, BMs:# 4qd, no pain, no brbpr\par        5/5/17:well on Lialda 4 qd , New Ins--dosent cover Lialda\par        BMs:# 4, 1-2x/D, CRP=10.6, ESR=23, CBC, CMET--wnl.

## 2019-06-07 NOTE — ASSESSMENT
[FreeTextEntry1] : \par Nausea: intermittent--resolved\par Assoc malaise, anorexia--better\par Regurg/ dyspepsia--better\par Hepatitis: TB is trending down, transaminases are normal\par 6 MP toxicity--d/c on 2/28\par Hep Serologies are neg; amylase/lipase--wnl\par some  GERD\par \par \par P: anti-reflux, lactose free, low fat\par    Pepcid 40 bid--> bid  alt w 1 qd --> prn \par    No NDSAIDs/ETOH\par    Hold 6MP--> D/C \par check: cbc, esr, crp, LFTs,\par \par \par \par \par 1. Ulcerative pancolitis without complication  : stable , No cramps, mucous/blood pr \par 10/5/17 w recent flare probably food borne, ESR=25, CRP=52--> 11/28/17: ESR=21, CRP=15-->12/21/17: ESR=22,CRP=39-->2/9/18: ESR=11, CRP<5 ->3/23/18: ESR=11,CRP=7-->\par 5/4/18: ESR=28, CRP=17-->7/31/18: ESR=38, CRP=9.7-->9/14/18: ESR=15, CRP<5-->10/12/18: ESR=11, CRP=6.2-->\par 11/9/18: ESR=13,  CRP=6--> 11/30/18: ESR=20, CRP=2.7--> 1/4/19: ESR=16, CRP=0.4-->2/8/19: ESR=20, CRP=0.7-->3/8/19: CRP=0.42--> 3/18/19: CRP=.14, ESR=36-->3/22/19: CRP=0.21, ESR=28--> 4/22/19: CRP=0.6, ESR=46\par Mild-Moderate Pancolitis-10/2010\par 3/2013: Sigmoid Inflammatory Polyps\par 8/13/18 Colonoscopy: mild-mod proctosigmoiditis--patchy\par 6 MP 50 mg qd: 8/23/18--> 75mg qd on 9/14/18--> 75mg alt w 100mg on 9/28/18-->100mg on 10/12/18-->100mg alt w 125mg on 11/11/18--> 125mg qd on 11/30 /18-->125mg alt w 150 on 1/4/19--> 150mg--> d/c\par Will need Humira, once LFTs normalize--discussed w pt, wanted to wait & will agree if Inflamm markers rise\par Uceris 9mg qd tapered off--5/2015, restarted 9mg qd 12/25/17--> 3/23/18: 1 qod\par Lialda 4 qd--> Apriso 3 grm 2/2 Ins--~2/2/18\par Flax seed Oil 3, Probiotic 3\par check labs--cbc,esr,crp, \par 5/4/18: TPMT =26.6\par Colonoscopy--surveillance: 8/2020.   \par \par \par \par \par 2. Diverticulosis of large intestine w/o  hemorrhage  :well controlled, no pain, infection,bleeding\par Discussed the potential complications of perforation, pain, infection, bleeding.\par Moderate -Fiber Diet was reviewed and emphasized.\par 6 - 8 cups of decaffeinated fluid daily.   \par \par \par \par \par 3. Irritable bowel syndrome with diarrhea : stable, no pain , no diarrhea, no bloat\par Moderate-Fiber diet, Low fat & lactose-free, low FODMAPS,\par increase fluid intake, reg exercise,\par Probiotics 3 daily \par \par \par \par \par 4. Gastroesophageal reflux disease w/o  esophagitis  :better w less  ht burn, No dysphagia, throat clear\par * No LPR, No Barretts w No Dysplasia , No Esophagitis grade: A \par *Anti-reflux diet and life-style changes emphasized. No Bedge. \par *Weight Reduction & reg exercise emphasized. \par * ++ PPI: Prilosec 20mg prn--> 40mg bid--> 40mg qam -->prn   , No H2B Q HS: , No Carafate 1gm: \par * No F/U EGD: (_ )mo. / (_ )yrs, \par * No pH Monitor, No Manometry, No esophagram \par * No ENT eval., No Surgical eval.   \par \par \par \par \par 5. Iron deficiency anemia due to chronic blood loss  \par  stable, last Hgb in 4/2019 = 12\par MVI, FA 1qd, Slo-Fe 1 qd\par check cbc.   \par \par \par \par \par 6. Internal hemorrhoids  :well - controlled, no pain, swelling, itch, bleeding \par *Discussed the potential complications of thrombosis, pain, bleeding, swelling, itching, infection. \par *High-Fiber Diet was reviewed and emphasized. \par *6 -- 8 cups of decaffeinated fluid daily \par * Sitz Bathes BID, No: Anusol HC Suppos/Cream [place] BID, \par * No: Tucks BID, No Balneol Lotion, No: Calmoseptine Oint, +++ Prep H prn \par * No: Colorectal Surgical evaluation for possible ablation.   \par \par \par \par

## 2019-06-11 LAB
BASOPHILS # BLD AUTO: 0.04 K/UL
BASOPHILS NFR BLD AUTO: 0.4 %
CRP SERPL-MCNC: 0.73 MG/DL
EOSINOPHIL # BLD AUTO: 0.04 K/UL
EOSINOPHIL NFR BLD AUTO: 0.4 %
ERYTHROCYTE [SEDIMENTATION RATE] IN BLOOD BY WESTERGREN METHOD: 35 MM/HR
HCT VFR BLD CALC: 45.4 %
HGB BLD-MCNC: 14.8 G/DL
IMM GRANULOCYTES NFR BLD AUTO: 0.5 %
LYMPHOCYTES # BLD AUTO: 1.23 K/UL
LYMPHOCYTES NFR BLD AUTO: 13 %
MAN DIFF?: NORMAL
MCHC RBC-ENTMCNC: 32.6 GM/DL
MCHC RBC-ENTMCNC: 34 PG
MCV RBC AUTO: 104.4 FL
MONOCYTES # BLD AUTO: 0.63 K/UL
MONOCYTES NFR BLD AUTO: 6.7 %
NEUTROPHILS # BLD AUTO: 7.46 K/UL
NEUTROPHILS NFR BLD AUTO: 79 %
PLATELET # BLD AUTO: 147 K/UL
RBC # BLD: 4.35 M/UL
RBC # FLD: 13 %
WBC # FLD AUTO: 9.45 K/UL

## 2019-07-09 ENCOUNTER — LABORATORY RESULT (OUTPATIENT)
Age: 64
End: 2019-07-09

## 2019-07-09 ENCOUNTER — APPOINTMENT (OUTPATIENT)
Dept: GASTROENTEROLOGY | Facility: CLINIC | Age: 64
End: 2019-07-09
Payer: MEDICAID

## 2019-07-09 VITALS
HEIGHT: 65 IN | HEART RATE: 63 BPM | DIASTOLIC BLOOD PRESSURE: 92 MMHG | WEIGHT: 234 LBS | SYSTOLIC BLOOD PRESSURE: 130 MMHG | BODY MASS INDEX: 38.99 KG/M2

## 2019-07-09 PROCEDURE — 99214 OFFICE O/P EST MOD 30 MIN: CPT

## 2019-07-09 NOTE — ASSESSMENT
[FreeTextEntry1] : \par Nausea: intermittent--resolved\par Assoc malaise, anorexia--better\par Regurg/ dyspepsia--better\par Hepatitis: TB is trending down, transaminases are normal\par 6 MP toxicity--d/c on 2/28\par Hep Serologies are neg; amylase/lipase--wnl\par some  GERD\par \par \par P: anti-reflux, lactose free, low fat\par    Pepcid 40 bid--> bid  alt w 1 qd --> prn \par    No NDSAIDs/ETOH\par    Hold 6MP--> D/C \par \par \par \par \par \par 1. Ulcerative pancolitis without complication  : stable , No cramps, mucous/blood pr \par 10/5/17 w recent flare probably food borne, ESR=25, CRP=52--> 11/28/17: ESR=21, CRP=15-->12/21/17: ESR=22,CRP=39-->2/9/18: ESR=11, CRP<5 ->3/23/18: ESR=11,CRP=7-->\par 5/4/18: ESR=28, CRP=17-->7/31/18: ESR=38, CRP=9.7-->9/14/18: ESR=15, CRP<5-->10/12/18: ESR=11, CRP=6.2-->\par 11/9/18: ESR=13,  CRP=6--> 11/30/18: ESR=20, CRP=2.7--> 1/4/19: ESR=16, CRP=0.4-->2/8/19: ESR=20, CRP=0.7-->3/8/19: CRP=0.42--> 3/18/19: CRP=.14, ESR=36-->3/22/19: CRP=0.21, ESR=28--> 4/22/19: CRP=0.6, ESR=46--> 6/7/19: CRP=0.73, ESR=35\par Mild-Moderate Pancolitis-10/2010\par 3/2013: Sigmoid Inflammatory Polyps\par 8/13/18 Colonoscopy: mild-mod proctosigmoiditis--patchy\par 6 MP 50 mg qd: 8/23/18--> 75mg qd on 9/14/18--> 75mg alt w 100mg on 9/28/18-->100mg on 10/12/18-->100mg alt w 125mg on 11/11/18--> 125mg qd on 11/30 /18-->125mg alt w 150 on 1/4/19--> 150mg--> d/c\par Will need Humira, once LFTs normalized but  Inflamm markers up:Load w 160, 80, 40 qow \par Discussed the risk and benefits including infections, lymphoma\par Uceris 9mg qd tapered off--5/2015, restarted 9mg qd 12/25/17--> 3/23/18: 1 qod-->6/11/19: 1 qd\par Lialda 4 qd--> Apriso 3 grm 2/2 Ins--~2/2/18\par Flax seed Oil 3, Probiotic 3\par check labs--cbc,esr,crp, PPD, Hep B sAG/AB & HepBcIgG\par 5/4/18: TPMT =26.6\par Colonoscopy--surveillance: 8/2020.   \par \par \par \par \par 2. Diverticulosis of large intestine w/o  hemorrhage  :well controlled, no pain, infection,bleeding\par Discussed the potential complications of perforation, pain, infection, bleeding.\par Moderate -Fiber Diet was reviewed and emphasized.\par 6 - 8 cups of decaffeinated fluid daily.   \par \par \par \par \par 3. Irritable bowel syndrome with diarrhea : stable, no pain , no diarrhea, no bloat\par Moderate-Fiber diet, Low fat & lactose-free, low FODMAPS,\par increase fluid intake, reg exercise,\par Probiotics 3 daily \par \par \par \par \par 4. Gastroesophageal reflux disease w/o  esophagitis  :better w less  ht burn, No dysphagia, throat clear\par * No LPR, No Barretts w No Dysplasia , No Esophagitis grade: A \par *Anti-reflux diet and life-style changes emphasized. No Bedge. \par *Weight Reduction & reg exercise emphasized. \par * ++ PPI: Prilosec 20mg prn--> 40mg bid--> 40mg qam -->prn   , No H2B Q HS: , No Carafate 1gm: \par * No F/U EGD: (_ )mo. / (_ )yrs, \par * No pH Monitor, No Manometry, No esophagram \par * No ENT eval., No Surgical eval.   \par \par \par \par \par 5. Iron deficiency anemia due to chronic blood loss  \par  stable, last Hgb in 4/2019 = 12\par MVI, FA 1qd, Slo-Fe 1 qd\par check cbc.   \par \par \par \par \par 6. Internal hemorrhoids  :well - controlled, no pain, swelling, itch, bleeding \par *Discussed the potential complications of thrombosis, pain, bleeding, swelling, itching, infection. \par *High-Fiber Diet was reviewed and emphasized. \par *6 -- 8 cups of decaffeinated fluid daily \par * Sitz Bathes BID, No: Anusol HC Suppos/Cream [place] BID, \par * No: Tucks BID, No Balneol Lotion, No: Calmoseptine Oint, +++ Prep H prn \par * No: Colorectal Surgical evaluation for possible ablation.   \par \par \par \par

## 2019-07-09 NOTE — HISTORY OF PRESENT ILLNESS
[de-identified] : \par \par  PCP: elaina/daron \par \par        62 yo WM h/o HTN, Obesity, IBS, GERD\par        Adm [facility] 10/2010 w Crampy abd pain, Diarrhea, Heme +, Hgb=9, WBC=21K\par        CT ABD: Diffuse wall thickening\par        Colonoscopy: Moderate Pancolitis\par        Rx Prednisone 60mg qd taper, Asacol 4 tid\par        6/17/11 completed pred taper but continued Asacol 4 tid, anemia resolved\par        3/19/13 Colonoscopy: sigmoid divers, inflammatory polyps sigmoid colon\par        Asacol HD 3 tid + Rowasa q hs\par        Late Sept 2017: had fast food hamburger and several hrs later\par        Lower abd cramping, Loose BMs: #5, 2-3x/D, Blood in stool, No fever, chills\par        10/5/17: feeling better on Lialda 4 qd\par        BMS: 4-5, 2x/D, cramps and bleeding stopped\par        Labs: WBC=8.9, Hgb=15, MCV=90, IMGs=0.6%, ESR=25, CRP=52, CMET=wnl\par        11/28/17:on Lialda 4 qd, occas cramp, BMs:# 4-5, 2-3x/D, occas # 6\par        Labs: Hgb=15, ESR=21, CRP=15.8\par        12/21/17:yesterday pm, cramping, BMs: 5, 1-3 x/d, w blood and mucous\par        This am sl cramping, no blood but mucous\par        WBC=10, CRP=39, ESR=22\par        Uceris 9mg qd started on 12/25/17, Lialda--> Apriso 3grm 2/2 Ins coverage about 1 week ago\par        2/9/18: BMs: #4-5, 1-2 x D, ESR=11, CRP<5\par        3/23/18: BMs: #4, 2 x D,no pain brbpr, ESR=11, CRP=7.4\par        5/4/18: BMs: # 4 qd, no pain or blood, ESR=28, CRP=17\par        6/15/18: BMs:#4 qd, no pain or blood\par        7/31/18: was feeling well on Uceris qod, thinks had bug ~ 1 week ago w ? chill, sl cramping, 1 day brbpr/mucous then resolved.\par        ESR=38, CRP=9.9\par        8/13/18 Colonoscopy: mild-mod patchy proctosigmoiditis--worse distal rectum, mod sigmoid diverticulosis, 0.5cm rectal polyp: HP, 2nd deg Hemorrhoids.\par        8/23/18: 6 MP 50mg qd, 9/14/18: Hgb=14.9, MCV=94, ESR=15, CRP<5\par        9/28/18: 6MP 75mg qd, Hgb=16.7, MCV=96, ESR=11, CRP=6.2\par        11/9/18: 6MP 100mg qd, Hgb=16, MCV=94, ESR=13, CRP=6\par        11/30/18: 6MP 100mg qd alt w 125mg x  3 weeks, Hgb=15, MCV=96, ESR=20, CRP=2.7\par        1/4/19: 6MP 125mg \par         2/8/19: Hgb=15, PON=373, ESR=20, CRP=0.7 on 6MP 125mg qd alt w 150 x 4 weeks \par         2/28/19:last wk some nausea, no pain, + malaise, no fever/chills/diarrhea, not hungry, \par        ate late the other night, awoke w throat burn, cough--> regurg\par        Also some Nausea/churning of stomach during the day--felt better w pepcid\par        Nochange in stool, no rash, jt pain, pruritus,new travel, recent contact w sick students--\par        BMs: # 4 qd, no blood or mucous;  6-MP held \par        Labs: TB=7.4, CVZ=645, ALT=124,AST=90, CRP=0.42,ESR=9, Hgb=14\par        3/1/19: TB=9,OCM=820,ALT=139, ESR=33, NEREYDA,ASMA,AMA,HCV,HAV,HBV--all neg\par        3/8/19: Feeling better, less nausea, eating, TB=2.6, BUI=528, ALT=213, CKI=242, CRP=.42\par        3/18/19: TB=1.2, PNV=106, ALT=111, AST=45, ESR=36, CRP=0.14\par        3/22/19: TB=0.9, ALP=85, ALT=70, AST=38, ESR=28, CRP=0.21;  BMs: # 4 qd, fw=420\par        4/26/19: TB=0.5, ALP=58, ALT=25,AST=19, ESR=46, CRP=0.6, BMs: #4qd, vr=070\par        6/7/19: ESR=35, CRP=0.73, Hgb=14, gg=076, BMs: #4qd\par \par Today: feeling well, no pain, N, diarrhea\par       BMs: # 4 qd, wt=236____\par        blood-- no\par        mucous--no\par        Abd cramping-no\par        Diarrhea--no\par        Bloat-no \par        BRBPR-no\par        Htburn-no\par        Dysphagia-no \par        N/V--no\par        Early satiety--no\par        Belching--no\par        Constipation--no\par        Melena--no\par        Rash--no\par        Oral ulcers--no\par \par \par \par        PRIOR HISTORY:\par        12/20/13: 2 weeks diarrhea 4-5x/D, mild cramps, scant blood\par        WBC=29K, CRP=30, ESR=12, stool Cdiff--neg\par        Uceris 9, Asacol HD 3 tid\par        5/15/15: Uceris tapered off a week prior, Lialda 2/1/2; HGB=15.8, CRP=7, ESR=6\par        8/2015: Sinus infection\par        1/8/16 : Hgb=15.9, CRP=8.2, ESR=18, BMs:# 4qd, no pain, no brbpr\par        5/5/17:well on Lialda 4 qd , New Ins--dosent cover Lialda\par        BMs:# 4, 1-2x/D, CRP=10.6, ESR=23, CBC, CMET--wnl.

## 2019-07-10 LAB
BASOPHILS # BLD AUTO: 0.05 K/UL
BASOPHILS NFR BLD AUTO: 0.5 %
CRP SERPL-MCNC: 0.19 MG/DL
EOSINOPHIL # BLD AUTO: 0.05 K/UL
EOSINOPHIL NFR BLD AUTO: 0.5 %
ERYTHROCYTE [SEDIMENTATION RATE] IN BLOOD BY WESTERGREN METHOD: 23 MM/HR
HBV CORE IGG+IGM SER QL: NONREACTIVE
HBV CORE IGM SER QL: NONREACTIVE
HBV SURFACE AB SER QL: NONREACTIVE
HCT VFR BLD CALC: 49.6 %
HGB BLD-MCNC: 15.6 G/DL
IMM GRANULOCYTES NFR BLD AUTO: 0.7 %
LYMPHOCYTES # BLD AUTO: 2 K/UL
LYMPHOCYTES NFR BLD AUTO: 18.9 %
MAN DIFF?: NORMAL
MCHC RBC-ENTMCNC: 31.5 GM/DL
MCHC RBC-ENTMCNC: 33.1 PG
MCV RBC AUTO: 105.3 FL
MONOCYTES # BLD AUTO: 1.01 K/UL
MONOCYTES NFR BLD AUTO: 9.6 %
NEUTROPHILS # BLD AUTO: 7.39 K/UL
NEUTROPHILS NFR BLD AUTO: 69.8 %
PLATELET # BLD AUTO: 138 K/UL
RBC # BLD: 4.71 M/UL
RBC # FLD: 13.5 %
WBC # FLD AUTO: 10.57 K/UL

## 2019-07-11 LAB
FOLATE SERPL-MCNC: 8.5 NG/ML
TSH SERPL-ACNC: 2.05 UIU/ML
VIT B12 SERPL-MCNC: <150 PG/ML

## 2019-07-12 LAB
M TB IFN-G BLD-IMP: NEGATIVE
QUANTIFERON TB PLUS MITOGEN MINUS NIL: >10 IU/ML
QUANTIFERON TB PLUS NIL: 0.02 IU/ML
QUANTIFERON TB PLUS TB1 MINUS NIL: 0 IU/ML
QUANTIFERON TB PLUS TB2 MINUS NIL: 0 IU/ML

## 2019-07-23 ENCOUNTER — APPOINTMENT (OUTPATIENT)
Dept: GASTROENTEROLOGY | Facility: CLINIC | Age: 64
End: 2019-07-23
Payer: MEDICAID

## 2019-07-23 VITALS
WEIGHT: 234 LBS | DIASTOLIC BLOOD PRESSURE: 90 MMHG | SYSTOLIC BLOOD PRESSURE: 150 MMHG | HEIGHT: 65 IN | BODY MASS INDEX: 38.99 KG/M2

## 2019-07-23 DIAGNOSIS — R11.0 NAUSEA: ICD-10-CM

## 2019-07-23 PROCEDURE — 99214 OFFICE O/P EST MOD 30 MIN: CPT

## 2019-07-23 NOTE — ASSESSMENT
[FreeTextEntry1] : \par Nausea: intermittent--resolved\par Assoc malaise, anorexia--better\par Regurg/ dyspepsia--better\par Hepatitis: TB is trending down, transaminases are normal\par 6 MP toxicity--d/c on 2/28\par Hep Serologies are neg; amylase/lipase--wnl\par some  GERD\par \par \par P: anti-reflux, lactose free, low fat\par    Pepcid 40 bid--> bid  alt w 1 qd --> prn \par    No NDSAIDs/ETOH\par    Hold 6MP--> D/C \par \par \par \par \par \par 1. Ulcerative pancolitis without complication  : stable , No cramps, mucous/blood pr \par 10/5/17 w recent flare probably food borne, ESR=25, CRP=52--> 11/28/17: ESR=21, CRP=15-->12/21/17: ESR=22,CRP=39-->2/9/18: ESR=11, CRP<5 ->3/23/18: ESR=11,CRP=7-->\par 5/4/18: ESR=28, CRP=17-->7/31/18: ESR=38, CRP=9.7-->9/14/18: ESR=15, CRP<5-->10/12/18: ESR=11, CRP=6.2-->\par 11/9/18: ESR=13,  CRP=6--> 11/30/18: ESR=20, CRP=2.7--> 1/4/19: ESR=16, CRP=0.4-->2/8/19: ESR=20, CRP=0.7-->3/8/19: CRP=0.42--> 3/18/19: CRP=.14, ESR=36-->3/22/19: CRP=0.21, ESR=28--> 4/22/19: CRP=0.6, ESR=46--> 6/7/19: CRP=0.73, ESR=35-->7/9/19: CRP=.19, ESR=23\par Mild-Moderate Pancolitis-10/2010\par 3/2013: Sigmoid Inflammatory Polyps\par 8/13/18 Colonoscopy: mild-mod proctosigmoiditis--patchy\par 6 MP 50 mg qd: 8/23/18--> 75mg qd on 9/14/18--> 75mg alt w 100mg on 9/28/18-->100mg on 10/12/18-->100mg alt w 125mg on 11/11/18--> 125mg qd on 11/30 /18-->125mg alt w 150 on 1/4/19--> 150mg--> d/c\par Will need Humira, once LFTs normalized but  Inflamm markers up:Load w 160, 80, 40 qow \par 160mg on 7/9/19, 80mg on 7/23/19\par Discussed the risk and benefits including infections, lymphoma\par Uceris 9mg qd tapered off--5/2015, restarted 9mg qd 12/25/17--> 3/23/18: 1 qod-->6/11/19: 1 qd\par Lialda 4 qd--> Apriso 3 grm 2/2 Ins--~2/2/18\par Flax seed Oil 3, Probiotic 3\par check labs--cbc,esr,crp,\par 5/4/18: TPMT =26.6\par Colonoscopy--surveillance: 8/2020.   \par \par \par \par \par 2. Diverticulosis of large intestine w/o  hemorrhage  :well controlled, no pain, infection,bleeding\par Discussed the potential complications of perforation, pain, infection, bleeding.\par Moderate -Fiber Diet was reviewed and emphasized.\par 6 - 8 cups of decaffeinated fluid daily.   \par \par \par \par \par 3. Irritable bowel syndrome with diarrhea : stable, no pain , no diarrhea, no bloat\par Moderate-Fiber diet, Low fat & lactose-free, low FODMAPS,\par increase fluid intake, reg exercise,\par Probiotics 3 daily \par \par \par \par \par 4. Gastroesophageal reflux disease w/o  esophagitis  :better w less  ht burn, No dysphagia, throat clear\par * No LPR, No Barretts w No Dysplasia , No Esophagitis grade: A \par *Anti-reflux diet and life-style changes emphasized. No Bedge. \par *Weight Reduction & reg exercise emphasized. \par * ++ PPI: Prilosec 20mg prn--> 40mg bid--> 40mg qam -->prn   , No H2B Q HS: , No Carafate 1gm: \par * No F/U EGD: (_ )mo. / (_ )yrs, \par * No pH Monitor, No Manometry, No esophagram \par * No ENT eval., No Surgical eval.   \par \par \par \par \par 5. Iron deficiency anemia due to chronic blood loss  \par  stable, last Hgb in 7/19=15 , KMO=840\par B12<150\par MVI, FA 1qd, Slo-Fe 1 qd\par check cbc, MMA, Homocysteine, IF AB, Parietal cell Abs\par supplement B12\par \par \par \par \par \par 6. Internal hemorrhoids  :well - controlled, no pain, swelling, itch, bleeding \par *Discussed the potential complications of thrombosis, pain, bleeding, swelling, itching, infection. \par *High-Fiber Diet was reviewed and emphasized. \par *6 -- 8 cups of decaffeinated fluid daily \par * Sitz Bathes BID, No: Anusol HC Suppos/Cream [place] BID, \par * No: Tucks BID, No Balneol Lotion, No: Calmoseptine Oint, +++ Prep H prn \par * No: Colorectal Surgical evaluation for possible ablation.   \par \par \par \par

## 2019-07-23 NOTE — HISTORY OF PRESENT ILLNESS
[de-identified] : \par \par  PCP: elaina/daron \par \par        62 yo WM h/o HTN, Obesity, IBS, GERD\par        Adm [facility] 10/2010 w Crampy abd pain, Diarrhea, Heme +, Hgb=9, WBC=21K\par        CT ABD: Diffuse wall thickening\par        Colonoscopy: Moderate Pancolitis\par        Rx Prednisone 60mg qd taper, Asacol 4 tid\par        6/17/11 completed pred taper but continued Asacol 4 tid, anemia resolved\par        3/19/13 Colonoscopy: sigmoid divers, inflammatory polyps sigmoid colon\par        Asacol HD 3 tid + Rowasa q hs\par        Late Sept 2017: had fast food hamburger and several hrs later\par        Lower abd cramping, Loose BMs: #5, 2-3x/D, Blood in stool, No fever, chills\par        10/5/17: feeling better on Lialda 4 qd\par        BMS: 4-5, 2x/D, cramps and bleeding stopped\par        Labs: WBC=8.9, Hgb=15, MCV=90, IMGs=0.6%, ESR=25, CRP=52, CMET=wnl\par        11/28/17:on Lialda 4 qd, occas cramp, BMs:# 4-5, 2-3x/D, occas # 6\par        Labs: Hgb=15, ESR=21, CRP=15.8\par        12/21/17:yesterday pm, cramping, BMs: 5, 1-3 x/d, w blood and mucous\par        This am sl cramping, no blood but mucous\par        WBC=10, CRP=39, ESR=22\par        Uceris 9mg qd started on 12/25/17, Lialda--> Apriso 3grm 2/2 Ins coverage about 1 week ago\par        2/9/18: BMs: #4-5, 1-2 x D, ESR=11, CRP<5\par        3/23/18: BMs: #4, 2 x D,no pain brbpr, ESR=11, CRP=7.4\par        5/4/18: BMs: # 4 qd, no pain or blood, ESR=28, CRP=17\par        6/15/18: BMs:#4 qd, no pain or blood\par        7/31/18: was feeling well on Uceris qod, thinks had bug ~ 1 week ago w ? chill, sl cramping, 1 day brbpr/mucous then resolved.\par        ESR=38, CRP=9.9\par        8/13/18 Colonoscopy: mild-mod patchy proctosigmoiditis--worse distal rectum, mod sigmoid diverticulosis, 0.5cm rectal polyp: HP, 2nd deg Hemorrhoids.\par        8/23/18: 6 MP 50mg qd, 9/14/18: Hgb=14.9, MCV=94, ESR=15, CRP<5\par        9/28/18: 6MP 75mg qd, Hgb=16.7, MCV=96, ESR=11, CRP=6.2\par        11/9/18: 6MP 100mg qd, Hgb=16, MCV=94, ESR=13, CRP=6\par        11/30/18: 6MP 100mg qd alt w 125mg x  3 weeks, Hgb=15, MCV=96, ESR=20, CRP=2.7\par        1/4/19: 6MP 125mg \par         2/8/19: Hgb=15, QIY=893, ESR=20, CRP=0.7 on 6MP 125mg qd alt w 150 x 4 weeks \par         2/28/19:last wk some nausea, no pain, + malaise, no fever/chills/diarrhea, not hungry, \par        ate late the other night, awoke w throat burn, cough--> regurg\par        Also some Nausea/churning of stomach during the day--felt better w pepcid\par        Nochange in stool, no rash, jt pain, pruritus,new travel, recent contact w sick students--\par        BMs: # 4 qd, no blood or mucous;  6-MP held \par        Labs: TB=7.4, SNG=559, ALT=124,AST=90, CRP=0.42,ESR=9, Hgb=14\par        3/1/19: TB=9,ZAA=412,ALT=139, ESR=33, NEREYDA,ASMA,AMA,HCV,HAV,HBV--all neg\par        3/8/19: Feeling better, less nausea, eating, TB=2.6, ZCI=635, ALT=213, PWL=973, CRP=.42\par        3/18/19: TB=1.2, HFM=614, ALT=111, AST=45, ESR=36, CRP=0.14\par        3/22/19: TB=0.9, ALP=85, ALT=70, AST=38, ESR=28, CRP=0.21;  BMs: # 4 qd, xn=193\par        4/26/19: TB=0.5, ALP=58, ALT=25,AST=19, ESR=46, CRP=0.6, BMs: #4qd, bp=976\par        6/7/19: ESR=35, CRP=0.73, Hgb=14, av=418, BMs: #4qd\par         7/9/19: ESR= 23  , CRP= .19   , Hgb=15.6, B12<150, & TSH, B12, FA, Hep BsAb/Bc Ab, Quantiferon all neg , zy=476, BMs: #4 ,. no bleeding \par \par \par Today: feeling well, no pain, N, diarrhea\par       BMs: # 4 qd, wt=236____\par        blood-- no\par        mucous--no\par        Abd cramping-no\par        Diarrhea--no\par        Bloat-no \par        BRBPR-no\par        Htburn-no\par        Dysphagia-no \par        N/V--no\par        Early satiety--no\par        Belching--no\par        Constipation--no\par        Melena--no\par        Rash--no\par        Oral ulcers--no\par \par \par \par        PRIOR HISTORY:\par        12/20/13: 2 weeks diarrhea 4-5x/D, mild cramps, scant blood\par        WBC=29K, CRP=30, ESR=12, stool Cdiff--neg\par        Uceris 9, Asacol HD 3 tid\par        5/15/15: Uceris tapered off a week prior, Lialda 2/1/2; HGB=15.8, CRP=7, ESR=6\par        8/2015: Sinus infection\par        1/8/16 : Hgb=15.9, CRP=8.2, ESR=18, BMs:# 4qd, no pain, no brbpr\par        5/5/17:well on Lialda 4 qd , New Ins--dosent cover Lialda\par        BMs:# 4, 1-2x/D, CRP=10.6, ESR=23, CBC, CMET--wnl.

## 2019-07-24 LAB
BASOPHILS # BLD AUTO: 0.04 K/UL
BASOPHILS NFR BLD AUTO: 0.4 %
CRP SERPL-MCNC: 0.11 MG/DL
EOSINOPHIL # BLD AUTO: 0.03 K/UL
EOSINOPHIL NFR BLD AUTO: 0.3 %
ERYTHROCYTE [SEDIMENTATION RATE] IN BLOOD BY WESTERGREN METHOD: 10 MM/HR
HCT VFR BLD CALC: 46.5 %
HCYS SERPL-MCNC: 13 UMOL/L
HGB BLD-MCNC: 14.5 G/DL
IMM GRANULOCYTES NFR BLD AUTO: 1.4 %
LYMPHOCYTES # BLD AUTO: 1.97 K/UL
LYMPHOCYTES NFR BLD AUTO: 21.6 %
MAN DIFF?: NORMAL
MCHC RBC-ENTMCNC: 31.2 GM/DL
MCHC RBC-ENTMCNC: 32.7 PG
MCV RBC AUTO: 105 FL
MONOCYTES # BLD AUTO: 0.81 K/UL
MONOCYTES NFR BLD AUTO: 8.9 %
NEUTROPHILS # BLD AUTO: 6.15 K/UL
NEUTROPHILS NFR BLD AUTO: 67.4 %
PLATELET # BLD AUTO: 125 K/UL
RBC # BLD: 4.43 M/UL
RBC # FLD: 14 %
WBC # FLD AUTO: 9.13 K/UL

## 2019-07-25 LAB
IF BLOCK AB SER QL: NORMAL
PCA AB SER QL IF: NORMAL

## 2019-07-26 LAB — METHYLMALONATE SERPL-SCNC: 791 NMOL/L

## 2019-07-29 ENCOUNTER — RX RENEWAL (OUTPATIENT)
Age: 64
End: 2019-07-29

## 2019-07-29 RX ORDER — ADALIMUMAB 40MG/0.4ML
40 KIT SUBCUTANEOUS
Qty: 6 | Refills: 3 | Status: ACTIVE | COMMUNITY
Start: 2019-06-11 | End: 1900-01-01

## 2019-08-08 ENCOUNTER — APPOINTMENT (OUTPATIENT)
Dept: GASTROENTEROLOGY | Facility: CLINIC | Age: 64
End: 2019-08-08
Payer: MEDICAID

## 2019-08-08 VITALS
SYSTOLIC BLOOD PRESSURE: 130 MMHG | HEART RATE: 80 BPM | HEIGHT: 65 IN | WEIGHT: 235 LBS | BODY MASS INDEX: 39.15 KG/M2 | DIASTOLIC BLOOD PRESSURE: 80 MMHG

## 2019-08-08 PROCEDURE — 99214 OFFICE O/P EST MOD 30 MIN: CPT

## 2019-08-08 NOTE — HISTORY OF PRESENT ILLNESS
[de-identified] : \par \par  PCP: elaina/daron \par \par        62 yo WM h/o HTN, Obesity, IBS, GERD\par        Adm [facility] 10/2010 w Crampy abd pain, Diarrhea, Heme +, Hgb=9, WBC=21K\par        CT ABD: Diffuse wall thickening\par        Colonoscopy: Moderate Pancolitis\par        Rx Prednisone 60mg qd taper, Asacol 4 tid\par        6/17/11 completed pred taper but continued Asacol 4 tid, anemia resolved\par        3/19/13 Colonoscopy: sigmoid divers, inflammatory polyps sigmoid colon\par        Asacol HD 3 tid + Rowasa q hs\par        Late Sept 2017: had fast food hamburger and several hrs later\par        Lower abd cramping, Loose BMs: #5, 2-3x/D, Blood in stool, No fever, chills\par        10/5/17: feeling better on Lialda 4 qd\par        BMS: 4-5, 2x/D, cramps and bleeding stopped\par        Labs: WBC=8.9, Hgb=15, MCV=90, IMGs=0.6%, ESR=25, CRP=52, CMET=wnl\par        11/28/17:on Lialda 4 qd, occas cramp, BMs:# 4-5, 2-3x/D, occas # 6\par        Labs: Hgb=15, ESR=21, CRP=15.8\par        12/21/17:yesterday pm, cramping, BMs: 5, 1-3 x/d, w blood and mucous\par        This am sl cramping, no blood but mucous\par        WBC=10, CRP=39, ESR=22\par        Uceris 9mg qd started on 12/25/17, Lialda--> Apriso 3grm 2/2 Ins coverage about 1 week ago\par        2/9/18: BMs: #4-5, 1-2 x D, ESR=11, CRP<5\par        3/23/18: BMs: #4, 2 x D,no pain brbpr, ESR=11, CRP=7.4\par        5/4/18: BMs: # 4 qd, no pain or blood, ESR=28, CRP=17\par        6/15/18: BMs:#4 qd, no pain or blood\par        7/31/18: was feeling well on Uceris qod, thinks had bug ~ 1 week ago w ? chill, sl cramping, 1 day brbpr/mucous then resolved.\par        ESR=38, CRP=9.9\par        8/13/18 Colonoscopy: mild-mod patchy proctosigmoiditis--worse distal rectum, mod sigmoid diverticulosis, 0.5cm rectal polyp: HP, 2nd deg Hemorrhoids.\par        8/23/18: 6 MP 50mg qd, 9/14/18: Hgb=14.9, MCV=94, ESR=15, CRP<5\par        9/28/18: 6MP 75mg qd, Hgb=16.7, MCV=96, ESR=11, CRP=6.2\par        11/9/18: 6MP 100mg qd, Hgb=16, MCV=94, ESR=13, CRP=6\par        11/30/18: 6MP 100mg qd alt w 125mg x  3 weeks, Hgb=15, MCV=96, ESR=20, CRP=2.7\par        1/4/19: 6MP 125mg \par         2/8/19: Hgb=15, IBR=837, ESR=20, CRP=0.7 on 6MP 125mg qd alt w 150 x 4 weeks \par         2/28/19:last wk some nausea, no pain, + malaise, no fever/chills/diarrhea, not hungry, \par        ate late the other night, awoke w throat burn, cough--> regurg\par        Also some Nausea/churning of stomach during the day--felt better w pepcid\par        Nochange in stool, no rash, jt pain, pruritus,new travel, recent contact w sick students--\par        BMs: # 4 qd, no blood or mucous;  6-MP held \par        Labs: TB=7.4, YZZ=048, ALT=124,AST=90, CRP=0.42,ESR=9, Hgb=14\par        3/1/19: TB=9,OSF=246,ALT=139, ESR=33, NEREYDA,ASMA,AMA,HCV,HAV,HBV--all neg\par        3/8/19: Feeling better, less nausea, eating, TB=2.6, TIC=796, ALT=213, GNJ=692, CRP=.42\par        3/18/19: TB=1.2, JIY=319, ALT=111, AST=45, ESR=36, CRP=0.14\par        3/22/19: TB=0.9, ALP=85, ALT=70, AST=38, ESR=28, CRP=0.21;  BMs: # 4 qd, vm=280\par        4/26/19: TB=0.5, ALP=58, ALT=25,AST=19, ESR=46, CRP=0.6, BMs: #4qd, km=097\par        6/7/19: ESR=35, CRP=0.73, Hgb=14, gd=588, BMs: #4qd\par        7/9/19: ESR= 23  , CRP= .19   , Hgb=15.6, B12<150, & TSH, FA, Hep BsAb/Bc Ab, Quantiferon          all neg , nz=500, BMs: #4 ,. no bleeding \par        7/23/19:ESR=10, CRP=0.11, Hgb=14.5, Vi=471, BMs: #4qd\par \par Today: feeling well, no pain, N, diarrhea\par       BMs: # 4 qd, wt=234____\par        blood-- no\par        mucous--no\par        Abd cramping-no\par        Diarrhea--no\par        Bloat-no \par        BRBPR-no\par        Htburn-no\par        Dysphagia-no \par        N/V--no\par        Early satiety--no\par        Belching--no\par        Constipation--no\par        Melena--no\par        Rash--no\par        Oral ulcers--no\par \par \par \par        PRIOR HISTORY:\par        12/20/13: 2 weeks diarrhea 4-5x/D, mild cramps, scant blood\par        WBC=29K, CRP=30, ESR=12, stool Cdiff--neg\par        Uceris 9, Asacol HD 3 tid\par        5/15/15: Uceris tapered off a week prior, Lialda 2/1/2; HGB=15.8, CRP=7, ESR=6\par        8/2015: Sinus infection\par        1/8/16 : Hgb=15.9, CRP=8.2, ESR=18, BMs:# 4qd, no pain, no brbpr\par        5/5/17:well on Lialda 4 qd , New Ins--dosent cover Lialda\par        BMs:# 4, 1-2x/D, CRP=10.6, ESR=23, CBC, CMET--wnl.

## 2019-08-08 NOTE — ASSESSMENT
[FreeTextEntry1] : \par Nausea: intermittent--resolved\par Assoc malaise, anorexia--better\par Regurg/ dyspepsia--better\par Hepatitis: TB is trending down, transaminases are normal\par 6 MP toxicity--d/c on 2/28\par Hep Serologies are neg; amylase/lipase--wnl\par some  GERD\par \par \par P: anti-reflux, lactose free, low fat\par    Pepcid 40 bid--> bid  alt w 1 qd --> prn \par    No NDSAIDs/ETOH\par    Hold 6MP--> D/C \par \par \par \par \par \par 1. Ulcerative pancolitis without complication  : stable , No cramps, mucous/blood pr \par 10/5/17 w recent flare probably food borne, ESR=25, CRP=52--> 11/28/17: ESR=21, CRP=15-->12/21/17: ESR=22,CRP=39-->2/9/18: ESR=11, CRP<5 ->3/23/18: ESR=11,CRP=7-->\par 5/4/18: ESR=28, CRP=17-->7/31/18: ESR=38, CRP=9.7-->9/14/18: ESR=15, CRP<5-->10/12/18: ESR=11, CRP=6.2-->\par 11/9/18: ESR=13,  CRP=6--> 11/30/18: ESR=20, CRP=2.7--> 1/4/19: ESR=16, CRP=0.4-->2/8/19: ESR=20, CRP=0.7-->3/8/19: CRP=0.42--> 3/18/19: CRP=.14, ESR=36-->3/22/19: CRP=0.21, ESR=28--> 4/22/19: CRP=0.6, ESR=46--> 6/7/19: CRP=0.73, ESR=35-->7/9/19: CRP=.19, ESR=23-->7/23/19: CRP=0.11, ESR=10\par Mild-Moderate Pancolitis-10/2010\par 3/2013: Sigmoid Inflammatory Polyps\par 8/13/18 Colonoscopy: mild-mod proctosigmoiditis--patchy\par 6 MP 50 mg qd: 8/23/18--> 75mg qd on 9/14/18--> 75mg alt w 100mg on 9/28/18-->100mg on 10/12/18-->100mg alt w 125mg on 11/11/18--> 125mg qd on 11/30 /18-->125mg alt w 150 on 1/4/19--> 150mg--> d/c\par Will need Humira, once LFTs normalized but  Inflamm markers up:Load w 160, 80, 40 qow \par 160mg on 7/9/19, 80mg on 7/23/19, 40mg qow\par Discussed the risk and benefits including infections, lymphoma\par Uceris 9mg qd tapered off--5/2015, restarted 9mg qd 12/25/17--> 3/23/18: 1 qod-->6/11/19: 1 qd\par Lialda 4 qd--> Apriso 3 grm 2/2 Ins--~2/2/18\par Flax seed Oil 3, Probiotic 3\par check labs--cbc,esr,crp,\par 5/4/18: TPMT =26.6\par Colonoscopy--surveillance: 8/2020.   \par \par \par \par \par 2. Diverticulosis of large intestine w/o  hemorrhage  :well controlled, no pain, infection,bleeding\par Discussed the potential complications of perforation, pain, infection, bleeding.\par Moderate -Fiber Diet was reviewed and emphasized.\par 6 - 8 cups of decaffeinated fluid daily.   \par \par \par \par \par 3. Irritable bowel syndrome with diarrhea : stable, no pain , no diarrhea, no bloat\par Moderate-Fiber diet, Low fat & lactose-free, low FODMAPS,\par increase fluid intake, reg exercise,\par Probiotics 3 daily \par \par \par \par \par 4. Gastroesophageal reflux disease w/o  esophagitis  :better w less  ht burn, No dysphagia, throat clear\par * No LPR, No Barretts w No Dysplasia , No Esophagitis grade: A \par *Anti-reflux diet and life-style changes emphasized. No Bedge. \par *Weight Reduction & reg exercise emphasized. \par * ++ PPI: Prilosec 20mg prn--> 40mg bid--> 40mg qam -->prn   , No H2B Q HS: , No Carafate 1gm: \par * No F/U EGD: (_ )mo. / (_ )yrs, \par * No pH Monitor, No Manometry, No esophagram \par * No ENT eval., No Surgical eval.   \par \par \par \par \par 5. Iron deficiency anemia due to chronic blood loss  \par  stable, last Hgb in 7/19=15 , PRT=000\par B12<150\par MVI, FA 1qd, Slo-Fe 1 qd\par check cbc, GSF=996 Homocysteine=13, IF AB & Parietal cell Abs both neg\par supplement B12--> sc and then po \par \par \par \par \par \par 6. Internal hemorrhoids  :well - controlled, no pain, swelling, itch, bleeding \par *Discussed the potential complications of thrombosis, pain, bleeding, swelling, itching, infection. \par *High-Fiber Diet was reviewed and emphasized. \par *6 -- 8 cups of decaffeinated fluid daily \par * Sitz Bathes BID, No: Anusol HC Suppos/Cream [place] BID, \par * No: Tucks BID, No Balneol Lotion, No: Calmoseptine Oint, +++ Prep H prn \par * No: Colorectal Surgical evaluation for possible ablation.   \par \par \par \par

## 2019-08-09 LAB
BASOPHILS # BLD AUTO: 0.03 K/UL
BASOPHILS NFR BLD AUTO: 0.3 %
EOSINOPHIL # BLD AUTO: 0.04 K/UL
EOSINOPHIL NFR BLD AUTO: 0.4 %
ERYTHROCYTE [SEDIMENTATION RATE] IN BLOOD BY WESTERGREN METHOD: 29 MM/HR
HCT VFR BLD CALC: 46.4 %
HGB BLD-MCNC: 15 G/DL
IMM GRANULOCYTES NFR BLD AUTO: 0.7 %
LYMPHOCYTES # BLD AUTO: 1.59 K/UL
LYMPHOCYTES NFR BLD AUTO: 17.2 %
MAN DIFF?: NORMAL
MCHC RBC-ENTMCNC: 32.3 GM/DL
MCHC RBC-ENTMCNC: 33 PG
MCV RBC AUTO: 102 FL
MONOCYTES # BLD AUTO: 1.05 K/UL
MONOCYTES NFR BLD AUTO: 11.4 %
NEUTROPHILS # BLD AUTO: 6.45 K/UL
NEUTROPHILS NFR BLD AUTO: 70 %
PLATELET # BLD AUTO: 133 K/UL
RBC # BLD: 4.55 M/UL
RBC # FLD: 14.5 %
WBC # FLD AUTO: 9.22 K/UL

## 2019-09-10 ENCOUNTER — APPOINTMENT (OUTPATIENT)
Dept: GASTROENTEROLOGY | Facility: CLINIC | Age: 64
End: 2019-09-10
Payer: MEDICAID

## 2019-09-10 VITALS
DIASTOLIC BLOOD PRESSURE: 110 MMHG | WEIGHT: 250 LBS | OXYGEN SATURATION: 96 % | HEIGHT: 65 IN | BODY MASS INDEX: 41.65 KG/M2 | SYSTOLIC BLOOD PRESSURE: 190 MMHG | HEART RATE: 64 BPM

## 2019-09-10 PROCEDURE — 96372 THER/PROPH/DIAG INJ SC/IM: CPT

## 2019-09-10 PROCEDURE — 36415 COLL VENOUS BLD VENIPUNCTURE: CPT

## 2019-09-10 PROCEDURE — 99214 OFFICE O/P EST MOD 30 MIN: CPT | Mod: 25

## 2019-09-10 RX ORDER — CYANOCOBALAMIN 1000 UG/ML
1000 INJECTION INTRAMUSCULAR; SUBCUTANEOUS
Qty: 0 | Refills: 0 | Status: COMPLETED | OUTPATIENT
Start: 2019-09-10

## 2019-09-10 RX ADMIN — CYANOCOBALAMIN 0 MCG/ML: 1000 INJECTION INTRAMUSCULAR; SUBCUTANEOUS at 00:00

## 2019-09-10 NOTE — ASSESSMENT
[FreeTextEntry1] : \par Nausea: intermittent--resolved\par Assoc malaise, anorexia--better\par Regurg/ dyspepsia--better\par Hepatitis: TB is trending down, transaminases are normal\par 6 MP toxicity--d/c on 2/28\par Hep Serologies are neg; amylase/lipase--wnl\par some  GERD\par \par \par P: anti-reflux, lactose free, low fat\par    Pepcid 40 bid--> bid  alt w 1 qd --> prn \par    No NDSAIDs/ETOH\par    Hold 6MP--> D/C \par \par \par \par \par \par 1. Ulcerative pancolitis without complication  : stable , No cramps, mucous/blood pr \par 10/5/17 w recent flare probably food borne, ESR=25, CRP=52--> 11/28/17: ESR=21, CRP=15-->12/21/17: ESR=22,CRP=39-->2/9/18: ESR=11, CRP<5 ->3/23/18: ESR=11,CRP=7-->\par 5/4/18: ESR=28, CRP=17-->7/31/18: ESR=38, CRP=9.7-->9/14/18: ESR=15, CRP<5-->10/12/18: ESR=11, CRP=6.2-->\par 11/9/18: ESR=13,  CRP=6--> 11/30/18: ESR=20, CRP=2.7--> 1/4/19: ESR=16, CRP=0.4-->2/8/19: ESR=20, CRP=0.7-->3/8/19: CRP=0.42--> 3/18/19: CRP=.14, ESR=36-->3/22/19: CRP=0.21, ESR=28--> 4/22/19: CRP=0.6, ESR=46--> 6/7/19: CRP=0.73, ESR=35-->7/9/19: CRP=.19, ESR=23-->7/23/19: CRP=0.11, ESR=10-->8/8/19: ESR=29\par Mild-Moderate Pancolitis-10/2010\par 3/2013: Sigmoid Inflammatory Polyps\par 8/13/18 Colonoscopy: mild-mod proctosigmoiditis--patchy\par 6 MP 50 mg qd: 8/23/18--> 75mg qd on 9/14/18--> 75mg alt w 100mg on 9/28/18-->100mg on 10/12/18-->100mg alt w 125mg on 11/11/18--> 125mg qd on 11/30 /18-->125mg alt w 150 on 1/4/19--> 150mg--> d/c\par Will need Humira, once LFTs normalized but  Inflamm markers up:Load w 160, 80, 40 qow \par 160mg on 7/9/19, 80mg on 7/23/19, 40mg qow\par Discussed the risk and benefits including infections, lymphoma\par Uceris 9mg qd tapered off--5/2015, restarted 9mg qd 12/25/17--> 3/23/18: 1 qod-->6/11/19: 1 qd\par Lialda 4 qd--> Apriso 3 grm 2/2 Ins--~2/2/18\par Flax seed Oil 3, Probiotic 3\par check labs--cbc,esr,crp,\par 5/4/18: TPMT =26.6\par Colonoscopy--surveillance: 8/2020.   \par \par \par \par \par 2. Diverticulosis of large intestine w/o  hemorrhage  :well controlled, no pain, infection,bleeding\par Discussed the potential complications of perforation, pain, infection, bleeding.\par Moderate -Fiber Diet was reviewed and emphasized.\par 6 - 8 cups of decaffeinated fluid daily.   \par \par \par \par \par 3. Irritable bowel syndrome with diarrhea : stable, no pain , no diarrhea, no bloat\par Moderate-Fiber diet, Low fat & lactose-free, low FODMAPS,\par increase fluid intake, reg exercise,\par Probiotics 3 daily \par \par \par \par \par 4. Gastroesophageal reflux disease w/o  esophagitis  :better w less  ht burn, No dysphagia, throat clear\par * No LPR, No Barretts w No Dysplasia , No Esophagitis grade: A \par *Anti-reflux diet and life-style changes emphasized. No Bedge. \par *Weight Reduction & reg exercise emphasized. \par * ++ PPI: Prilosec 20mg prn--> 40mg bid--> 40mg qam -->prn   , No H2B Q HS: , No Carafate 1gm: \par * No F/U EGD: (_ )mo. / (_ )yrs, \par * No pH Monitor, No Manometry, No esophagram \par * No ENT eval., No Surgical eval.   \par \par \par \par \par 5. Iron deficiency anemia due to chronic blood loss  \par  stable, last Hgb in 7/19=15 , BHI=815\par B12<150\par MVI, FA 1qd, Slo-Fe 1 qd\par check cbc, GTW=706 Homocysteine=13, IF AB & Parietal cell Abs both neg\par supplement B12--> sc and then po \par B12: #1 9/20/19: 1cc\par \par \par \par \par \par 6. Internal hemorrhoids  :well - controlled, no pain, swelling, itch, bleeding \par *Discussed the potential complications of thrombosis, pain, bleeding, swelling, itching, infection. \par *High-Fiber Diet was reviewed and emphasized. \par *6 -- 8 cups of decaffeinated fluid daily \par * Sitz Bathes BID, No: Anusol HC Suppos/Cream [place] BID, \par * No: Tucks BID, No Balneol Lotion, No: Calmoseptine Oint, +++ Prep H prn \par * No: Colorectal Surgical evaluation for possible ablation.   \par \par \par \par

## 2019-09-10 NOTE — HISTORY OF PRESENT ILLNESS
[de-identified] : \par \par  PCP: elaina/daron \par \par        62 yo WM h/o HTN, Obesity, IBS, GERD\par        Adm [facility]HC 10/2010 w Crampy abd pain, Diarrhea, Heme +, Hgb=9, WBC=21K\par        CT ABD: Diffuse wall thickening\par        Colonoscopy: Moderate Pancolitis\par        Rx Prednisone 60mg qd taper, Asacol 4 tid\par        6/17/11 completed pred taper but continued Asacol 4 tid, anemia resolved\par        3/19/13 Colonoscopy: sigmoid divers, inflammatory polyps sigmoid colon\par        Asacol HD 3 tid + Rowasa q hs\par        Late Sept 2017: had fast food hamburger and several hrs later\par        Lower abd cramping, Loose BMs: #5, 2-3x/D, Blood in stool, No fever, chills\par        10/5/17: feeling better on Lialda 4 qd\par        BMS: 4-5, 2x/D, cramps and bleeding stopped\par        Labs: WBC=8.9, Hgb=15, MCV=90, IMGs=0.6%, ESR=25, CRP=52, CMET=wnl\par        11/28/17:on Lialda 4 qd, occas cramp, BMs:# 4-5, 2-3x/D, occas # 6\par        Labs: Hgb=15, ESR=21, CRP=15.8\par        12/21/17:yesterday pm, cramping, BMs: 5, 1-3 x/d, w blood and mucous\par        This am sl cramping, no blood but mucous\par        WBC=10, CRP=39, ESR=22\par        Uceris 9mg qd started on 12/25/17, Lialda--> Apr[employer] 3grm 2/2 Ins coverage about 1 week ago\par        2/9/18: BMs: #4-5, 1-2 x D, ESR=11, CRP<5\par        3/23/18: BMs: #4, 2 x D,no pain brbpr, ESR=11, CRP=7.4\par        5/4/18: BMs: # 4 qd, no pain or blood, ESR=28, CRP=17\par        6/15/18: BMs:#4 qd, no pain or blood\par        7/31/18: was feeling well on Uceris qod, thinks had bug ~ 1 week ago w ? chill, sl cramping, 1 day brbpr/mucous then resolved.\par        ESR=38, CRP=9.9\par        8/13/18 Colonoscopy: mild-mod patchy proctosigmoiditis--worse distal rectum, mod sigmoid diverticulosis, 0.5cm rectal polyp: HP, 2nd deg Hemorrhoids.\par        8/23/18: 6 MP 50mg qd, 9/14/18: Hgb=14.9, MCV=94, ESR=15, CRP<5\par        9/28/18: 6MP 75mg qd, Hgb=16.7, MCV=96, ESR=11, CRP=6.2\par        11/9/18: 6MP 100mg qd, Hgb=16, MCV=94, ESR=13, CRP=6\par        11/30/18: 6MP 100mg qd alt w 125mg x  3 weeks, Hgb=15, MCV=96, ESR=20, CRP=2.7\par        1/4/19: 6MP 125mg \par         2/8/19: Hgb=15, CXD=857, ESR=20, CRP=0.7 on 6MP 125mg qd alt w 150 x 4 weeks \par         2/28/19:last wk some nausea, no pain, + malaise, no fever/chills/diarrhea, not hungry, \par        ate late the other night, awoke w throat burn, cough--> regurg\par        Also some Nausea/churning of stomach during the day--felt better w pepcid\par        No change in stool, no rash, jt pain, pruritus,new travel, recent contact w sick students--\par        BMs: # 4 qd, no blood or mucous;  6-MP held \par        Labs: TB=7.4, LOS=513, ALT=124,AST=90, CRP=0.42,ESR=9, Hgb=14\par        3/1/19: TB=9,OJR=909,ALT=139, ESR=33, NEREYDA,ASMA,AMA,HCV,HAV,HBV--all neg\par        3/8/19: Feeling better, less nausea, eating, TB=2.6, FKF=327, ALT=213, NON=203, CRP=.42\par        3/18/19: TB=1.2, OMA=035, ALT=111, AST=45, ESR=36, CRP=0.14\par        3/22/19: TB=0.9, ALP=85, ALT=70, AST=38, ESR=28, CRP=0.21;  BMs: # 4 qd, gi=429\par        4/26/19: TB=0.5, ALP=58, ALT=25,AST=19, ESR=46, CRP=0.6, BMs: #4qd, hz=721\par        6/7/19: ESR=35, CRP=0.73, Hgb=14, oy=471, BMs: #4qd\par        7/9/19: ESR= 23  , CRP= .19   , Hgb=15.6, B12<150, & TSH, FA, Hep BsAb/Bc Ab, Quantiferon          all neg , oe=893, BMs: #4 ,. no bleeding \par        7/23/19:ESR=10, CRP=0.11, Hgb=14.5, Rt=212, BMs: #4qd\par        8/8/19: ESR=29, gp=156, BMs: #4 qd, no brbpr\par \par Today: feeling well, no pain, N, diarrhea\par       BMs: # 4 qd, wt=250____\par        blood-- no\par        mucous--no\par        Abd cramping-no\par        Diarrhea--no\par        Bloat-no \par        BRBPR-no\par        Htburn-no\par        Dysphagia-no \par        N/V--no\par        Early satiety--no\par        Belching--no\par        Constipation--no\par        Melena--no\par        Rash--no\par        Oral ulcers--no\par \par \par \par        PRIOR HISTORY:\par        12/20/13: 2 weeks diarrhea 4-5x/D, mild cramps, scant blood\par        WBC=29K, CRP=30, ESR=12, stool Cdiff--neg\par        Uceris 9, Asacol HD 3 tid\par        5/15/15: Uceris tapered off a week prior, Lialda 2/1/2; HGB=15.8, CRP=7, ESR=6\par        8/2015: Sinus infection\par        1/8/16 : Hgb=15.9, CRP=8.2, ESR=18, BMs:# 4qd, no pain, no brbpr\par        5/5/17:well on Lialda 4 qd , New Ins--dosent cover Lialda\par        BMs:# 4, 1-2x/D, CRP=10.6, ESR=23, CBC, CMET--wnl.

## 2019-09-11 LAB
BASOPHILS # BLD AUTO: 0.05 K/UL
BASOPHILS NFR BLD AUTO: 0.5 %
CRP SERPL-MCNC: 0.27 MG/DL
EOSINOPHIL # BLD AUTO: 0.02 K/UL
EOSINOPHIL NFR BLD AUTO: 0.2 %
HCT VFR BLD CALC: 45.6 %
HGB BLD-MCNC: 15.1 G/DL
IMM GRANULOCYTES NFR BLD AUTO: 1.3 %
LYMPHOCYTES # BLD AUTO: 1.67 K/UL
LYMPHOCYTES NFR BLD AUTO: 16.2 %
MAN DIFF?: NORMAL
MCHC RBC-ENTMCNC: 33.1 GM/DL
MCHC RBC-ENTMCNC: 34.2 PG
MCV RBC AUTO: 103.2 FL
MONOCYTES # BLD AUTO: 0.92 K/UL
MONOCYTES NFR BLD AUTO: 8.9 %
NEUTROPHILS # BLD AUTO: 7.5 K/UL
NEUTROPHILS NFR BLD AUTO: 72.9 %
PLATELET # BLD AUTO: 142 K/UL
RBC # BLD: 4.42 M/UL
RBC # FLD: 15.2 %
WBC # FLD AUTO: 10.29 K/UL

## 2019-09-13 LAB — ERYTHROCYTE [SEDIMENTATION RATE] IN BLOOD BY WESTERGREN METHOD: 18 MM/HR

## 2019-09-20 ENCOUNTER — MED ADMIN CHARGE (OUTPATIENT)
Age: 64
End: 2019-09-20

## 2019-09-20 ENCOUNTER — APPOINTMENT (OUTPATIENT)
Dept: GASTROENTEROLOGY | Facility: CLINIC | Age: 64
End: 2019-09-20
Payer: MEDICAID

## 2019-09-20 VITALS
HEIGHT: 65 IN | DIASTOLIC BLOOD PRESSURE: 98 MMHG | BODY MASS INDEX: 41.65 KG/M2 | WEIGHT: 250 LBS | HEART RATE: 64 BPM | SYSTOLIC BLOOD PRESSURE: 148 MMHG

## 2019-09-20 PROCEDURE — 99212 OFFICE O/P EST SF 10 MIN: CPT | Mod: 25

## 2019-09-20 PROCEDURE — 96372 THER/PROPH/DIAG INJ SC/IM: CPT

## 2019-09-20 RX ORDER — CYANOCOBALAMIN 1000 UG/ML
1000 INJECTION INTRAMUSCULAR; SUBCUTANEOUS
Qty: 0 | Refills: 0 | Status: COMPLETED | OUTPATIENT
Start: 2019-09-20

## 2019-09-20 RX ADMIN — CYANOCOBALAMIN 0 MCG/ML: 1000 INJECTION INTRAMUSCULAR; SUBCUTANEOUS at 00:00

## 2019-09-20 NOTE — HISTORY OF PRESENT ILLNESS
[de-identified] : \par \par  PCP: elaina/daron \par \par        62 yo WM h/o HTN, Obesity, IBS, GERD\par        Adm [facility]HC 10/2010 w Crampy abd pain, Diarrhea, Heme +, Hgb=9, WBC=21K\par        CT ABD: Diffuse wall thickening\par        Colonoscopy: Moderate Pancolitis\par        Rx Prednisone 60mg qd taper, Asacol 4 tid\par        6/17/11 completed pred taper but continued Asacol 4 tid, anemia resolved\par        3/19/13 Colonoscopy: sigmoid divers, inflammatory polyps sigmoid colon\par        Asacol HD 3 tid + Rowasa q hs\par        Late Sept 2017: had fast food hamburger and several hrs later\par        Lower abd cramping, Loose BMs: #5, 2-3x/D, Blood in stool, No fever, chills\par        10/5/17: feeling better on Lialda 4 qd\par        BMS: 4-5, 2x/D, cramps and bleeding stopped\par        Labs: WBC=8.9, Hgb=15, MCV=90, IMGs=0.6%, ESR=25, CRP=52, CMET=wnl\par        11/28/17:on Lialda 4 qd, occas cramp, BMs:# 4-5, 2-3x/D, occas # 6\par        Labs: Hgb=15, ESR=21, CRP=15.8\par        12/21/17:yesterday pm, cramping, BMs: 5, 1-3 x/d, w blood and mucous\par        This am sl cramping, no blood but mucous\par        WBC=10, CRP=39, ESR=22\par        Uceris 9mg qd started on 12/25/17, Lialda--> Apr[employer] 3grm 2/2 Ins coverage about 1 week ago\par        2/9/18: BMs: #4-5, 1-2 x D, ESR=11, CRP<5\par        3/23/18: BMs: #4, 2 x D,no pain brbpr, ESR=11, CRP=7.4\par        5/4/18: BMs: # 4 qd, no pain or blood, ESR=28, CRP=17\par        6/15/18: BMs:#4 qd, no pain or blood\par        7/31/18: was feeling well on Uceris qod, thinks had bug ~ 1 week ago w ? chill, sl cramping, 1 day brbpr/mucous then resolved.\par        ESR=38, CRP=9.9\par        8/13/18 Colonoscopy: mild-mod patchy proctosigmoiditis--worse distal rectum, mod sigmoid diverticulosis, 0.5cm rectal polyp: HP, 2nd deg Hemorrhoids.\par        8/23/18: 6 MP 50mg qd, 9/14/18: Hgb=14.9, MCV=94, ESR=15, CRP<5\par        9/28/18: 6MP 75mg qd, Hgb=16.7, MCV=96, ESR=11, CRP=6.2\par        11/9/18: 6MP 100mg qd, Hgb=16, MCV=94, ESR=13, CRP=6\par        11/30/18: 6MP 100mg qd alt w 125mg x  3 weeks, Hgb=15, MCV=96, ESR=20, CRP=2.7\par        1/4/19: 6MP 125mg \par         2/8/19: Hgb=15, PYU=063, ESR=20, CRP=0.7 on 6MP 125mg qd alt w 150 x 4 weeks \par         2/28/19:last wk some nausea, no pain, + malaise, no fever/chills/diarrhea, not hungry, \par        ate late the other night, awoke w throat burn, cough--> regurg\par        Also some Nausea/churning of stomach during the day--felt better w pepcid\par        No change in stool, no rash, jt pain, pruritus,new travel, recent contact w sick students--\par        BMs: # 4 qd, no blood or mucous;  6-MP held \par        Labs: TB=7.4, WXA=587, ALT=124,AST=90, CRP=0.42,ESR=9, Hgb=14\par        3/1/19: TB=9,MZD=519,ALT=139, ESR=33, NEREYDA,ASMA,AMA,HCV,HAV,HBV--all neg\par        3/8/19: Feeling better, less nausea, eating, TB=2.6, ZWX=863, ALT=213, DVT=715, CRP=.42\par        3/18/19: TB=1.2, BOZ=735, ALT=111, AST=45, ESR=36, CRP=0.14\par        3/22/19: TB=0.9, ALP=85, ALT=70, AST=38, ESR=28, CRP=0.21;  BMs: # 4 qd, yz=926\par        4/26/19: TB=0.5, ALP=58, ALT=25,AST=19, ESR=46, CRP=0.6, BMs: #4qd, si=312\par        6/7/19: ESR=35, CRP=0.73, Hgb=14, ar=578, BMs: #4qd\par        7/9/19: ESR= 23  , CRP= .19   , Hgb=15.6, B12<150, & TSH, FA, Hep BsAb/Bc Ab, Quantiferon          all neg , gv=832, BMs: #4 ,. no bleeding \par        7/23/19:ESR=10, CRP=0.11, Hgb=14.5, Iu=903, BMs: #4qd\par        8/8/19: ESR=29, yr=808, BMs: #4 qd, no brbpr\par        9/10/19: ESR=18, CRP=0.27, Hgb=15, ld=311, BMS:# 4qd\par \par Today: feeling well, no pain, N, diarrhea\par       BMs: # 4 qd, wt=249____\par        blood-- no\par        mucous--no\par        Abd cramping-no\par        Diarrhea--no\par        Bloat-no \par        BRBPR-no\par        Htburn-no\par        Dysphagia-no \par        N/V--no\par        Early satiety--no\par        Belching--no\par        Constipation--no\par        Melena--no\par        Rash--no\par        Oral ulcers--no\par \par \par \par        PRIOR HISTORY:\par        12/20/13: 2 weeks diarrhea 4-5x/D, mild cramps, scant blood\par        WBC=29K, CRP=30, ESR=12, stool Cdiff--neg\par        Uceris 9, Asacol HD 3 tid\par        5/15/15: Uceris tapered off a week prior, Lialda 2/1/2; HGB=15.8, CRP=7, ESR=6\par        8/2015: Sinus infection\par        1/8/16 : Hgb=15.9, CRP=8.2, ESR=18, BMs:# 4qd, no pain, no brbpr\par        5/5/17:well on Lialda 4 qd , New Ins--dosent cover Lialda\par        BMs:# 4, 1-2x/D, CRP=10.6, ESR=23, CBC, CMET--wnl.

## 2019-09-20 NOTE — ASSESSMENT
[FreeTextEntry1] : \par Nausea: intermittent--resolved\par Assoc malaise, anorexia--better\par Regurg/ dyspepsia--better\par Hepatitis: TB is trending down, transaminases are normal\par 6 MP toxicity--d/c on 2/28\par Hep Serologies are neg; amylase/lipase--wnl\par some  GERD\par \par \par P: anti-reflux, lactose free, low fat\par    Pepcid 40 bid--> bid  alt w 1 qd --> prn \par    No NDSAIDs/ETOH\par    Hold 6MP--> D/C \par \par \par \par \par \par 1. Ulcerative pancolitis without complication  : stable , No cramps, mucous/blood pr \par 10/5/17 w recent flare probably food borne, ESR=25, CRP=52--> 11/28/17: ESR=21, CRP=15-->12/21/17: ESR=22,CRP=39-->2/9/18: ESR=11, CRP<5 ->3/23/18: ESR=11,CRP=7-->\par 5/4/18: ESR=28, CRP=17-->7/31/18: ESR=38, CRP=9.7-->9/14/18: ESR=15, CRP<5-->10/12/18: ESR=11, CRP=6.2-->\par 11/9/18: ESR=13,  CRP=6--> 11/30/18: ESR=20, CRP=2.7--> 1/4/19: ESR=16, CRP=0.4-->2/8/19: ESR=20, CRP=0.7-->3/8/19: CRP=0.42--> 3/18/19: CRP=.14, ESR=36-->3/22/19: CRP=0.21, ESR=28--> 4/22/19: CRP=0.6, ESR=46--> 6/7/19: CRP=0.73, ESR=35-->7/9/19: CRP=.19, ESR=23-->7/23/19: CRP=0.11, ESR=10-->8/8/19: ESR=29-->9/10/19:ESR=18, CRP=0.27\par Mild-Moderate Pancolitis-10/2010\par 3/2013: Sigmoid Inflammatory Polyps\par 8/13/18 Colonoscopy: mild-mod proctosigmoiditis--patchy\par 6 MP 50 mg qd: 8/23/18--> 75mg qd on 9/14/18--> 75mg alt w 100mg on 9/28/18-->100mg on 10/12/18-->100mg alt w 125mg on 11/11/18--> 125mg qd on 11/30 /18-->125mg alt w 150 on 1/4/19--> 150mg--> d/c\par Will need Humira, once LFTs normalized but  Inflamm markers up:Load w 160, 80, 40 qow \par 160mg on 7/9/19, 80mg on 7/23/19, 40mg qow\par Discussed the risk and benefits including infections, lymphoma\par Uceris 9mg qd tapered off--5/2015, restarted 9mg qd 12/25/17--> 3/23/18: 1 qod-->6/11/19: 1 qd\par Lialda 4 qd--> Apriso 3 grm 2/2 Ins--~2/2/18\par Flax seed Oil 3, Probiotic 3\par check labs--cbc,esr,crp,\par 5/4/18: TPMT =26.6\par Colonoscopy--surveillance: 8/2020.   \par \par \par \par \par 2. Diverticulosis of large intestine w/o  hemorrhage  :well controlled, no pain, infection,bleeding\par Discussed the potential complications of perforation, pain, infection, bleeding.\par Moderate -Fiber Diet was reviewed and emphasized.\par 6 - 8 cups of decaffeinated fluid daily.   \par \par \par \par \par 3. Irritable bowel syndrome with diarrhea : stable, no pain , no diarrhea, no bloat\par Moderate-Fiber diet, Low fat & lactose-free, low FODMAPS,\par increase fluid intake, reg exercise,\par Probiotics 3 daily \par \par \par \par \par 4. Gastroesophageal reflux disease w/o  esophagitis  :better w less  ht burn, No dysphagia, throat clear\par * No LPR, No Barretts w No Dysplasia , No Esophagitis grade: A \par *Anti-reflux diet and life-style changes emphasized. No Bedge. \par *Weight Reduction & reg exercise emphasized. \par * ++ PPI: Prilosec 20mg prn--> 40mg bid--> 40mg qam -->prn   , No H2B Q HS: , No Carafate 1gm: \par * No F/U EGD: (_ )mo. / (_ )yrs, \par * No pH Monitor, No Manometry, No esophagram \par * No ENT eval., No Surgical eval.   \par \par \par \par \par 5. Anemia due to chronic blood loss  & B12 Deficiency\par  stable, last Hgb in 7/19=15 , VNN=235\par B12<150\par MVI, FA 1qd, Slo-Fe 1 qd\par check cbc, JFC=919 Homocysteine=13, IF AB & Parietal cell Abs both neg\par supplement B12--> sc and then po \par B12: #1 9/10/19: 1cc,  #2- 9/20/19: 1cc\par \par \par \par \par \par 6. Internal hemorrhoids  :well - controlled, no pain, swelling, itch, bleeding \par *Discussed the potential complications of thrombosis, pain, bleeding, swelling, itching, infection. \par *High-Fiber Diet was reviewed and emphasized. \par *6 -- 8 cups of decaffeinated fluid daily \par * Sitz Bathes BID, No: Anusol HC Suppos/Cream CA BID, \par * No: Tucks BID, No Balneol Lotion, No: Calmoseptine Oint, +++ Prep H prn \par * No: Colorectal Surgical evaluation for possible ablation.   \par \par \par \par

## 2019-09-27 ENCOUNTER — APPOINTMENT (OUTPATIENT)
Dept: GASTROENTEROLOGY | Facility: CLINIC | Age: 64
End: 2019-09-27
Payer: MEDICAID

## 2019-09-27 ENCOUNTER — MED ADMIN CHARGE (OUTPATIENT)
Age: 64
End: 2019-09-27

## 2019-09-27 VITALS
DIASTOLIC BLOOD PRESSURE: 98 MMHG | SYSTOLIC BLOOD PRESSURE: 146 MMHG | WEIGHT: 250 LBS | HEIGHT: 65 IN | BODY MASS INDEX: 41.65 KG/M2 | HEART RATE: 68 BPM

## 2019-09-27 PROCEDURE — 99212 OFFICE O/P EST SF 10 MIN: CPT | Mod: 25

## 2019-09-27 PROCEDURE — 96372 THER/PROPH/DIAG INJ SC/IM: CPT

## 2019-09-27 RX ORDER — CYANOCOBALAMIN 1000 UG/ML
1000 INJECTION INTRAMUSCULAR; SUBCUTANEOUS
Qty: 0 | Refills: 0 | Status: COMPLETED | OUTPATIENT
Start: 2019-09-27

## 2019-09-27 RX ADMIN — CYANOCOBALAMIN 0 MCG/ML: 1000 INJECTION INTRAMUSCULAR; SUBCUTANEOUS at 00:00

## 2019-09-27 NOTE — HISTORY OF PRESENT ILLNESS
[de-identified] : \par \par  PCP: elaina/daron \par \par        62 yo WM h/o HTN, Obesity, IBS, GERD\par        Adm [facility]HC 10/2010 w Crampy abd pain, Diarrhea, Heme +, Hgb=9, WBC=21K\par        CT ABD: Diffuse wall thickening\par        Colonoscopy: Moderate Pancolitis\par        Rx Prednisone 60mg qd taper, Asacol 4 tid\par        6/17/11 completed pred taper but continued Asacol 4 tid, anemia resolved\par        3/19/13 Colonoscopy: sigmoid divers, inflammatory polyps sigmoid colon\par        Asacol HD 3 tid + Rowasa q hs\par        Late Sept 2017: had fast food hamburger and several hrs later\par        Lower abd cramping, Loose BMs: #5, 2-3x/D, Blood in stool, No fever, chills\par        10/5/17: feeling better on Lialda 4 qd\par        BMS: 4-5, 2x/D, cramps and bleeding stopped\par        Labs: WBC=8.9, Hgb=15, MCV=90, IMGs=0.6%, ESR=25, CRP=52, CMET=wnl\par        11/28/17:on Lialda 4 qd, occas cramp, BMs:# 4-5, 2-3x/D, occas # 6\par        Labs: Hgb=15, ESR=21, CRP=15.8\par        12/21/17:yesterday pm, cramping, BMs: 5, 1-3 x/d, w blood and mucous\par        This am sl cramping, no blood but mucous\par        WBC=10, CRP=39, ESR=22\par        Uceris 9mg qd started on 12/25/17, Lialda--> Apr[employer] 3grm 2/2 Ins coverage about 1 week ago\par        2/9/18: BMs: #4-5, 1-2 x D, ESR=11, CRP<5\par        3/23/18: BMs: #4, 2 x D,no pain brbpr, ESR=11, CRP=7.4\par        5/4/18: BMs: # 4 qd, no pain or blood, ESR=28, CRP=17\par        6/15/18: BMs:#4 qd, no pain or blood\par        7/31/18: was feeling well on Uceris qod, thinks had bug ~ 1 week ago w ? chill, sl cramping, 1 day brbpr/mucous then resolved.\par        ESR=38, CRP=9.9\par        8/13/18 Colonoscopy: mild-mod patchy proctosigmoiditis--worse distal rectum, mod sigmoid diverticulosis, 0.5cm rectal polyp: HP, 2nd deg Hemorrhoids.\par        8/23/18: 6 MP 50mg qd, 9/14/18: Hgb=14.9, MCV=94, ESR=15, CRP<5\par        9/28/18: 6MP 75mg qd, Hgb=16.7, MCV=96, ESR=11, CRP=6.2\par        11/9/18: 6MP 100mg qd, Hgb=16, MCV=94, ESR=13, CRP=6\par        11/30/18: 6MP 100mg qd alt w 125mg x  3 weeks, Hgb=15, MCV=96, ESR=20, CRP=2.7\par        1/4/19: 6MP 125mg \par         2/8/19: Hgb=15, FHK=821, ESR=20, CRP=0.7 on 6MP 125mg qd alt w 150 x 4 weeks \par         2/28/19:last wk some nausea, no pain, + malaise, no fever/chills/diarrhea, not hungry, \par        ate late the other night, awoke w throat burn, cough--> regurg\par        Also some Nausea/churning of stomach during the day--felt better w pepcid\par        No change in stool, no rash, jt pain, pruritus,new travel, recent contact w sick students--\par        BMs: # 4 qd, no blood or mucous;  6-MP held \par        Labs: TB=7.4, MZU=883, ALT=124,AST=90, CRP=0.42,ESR=9, Hgb=14\par        3/1/19: TB=9,ZRI=178,ALT=139, ESR=33, NEREYDA,ASMA,AMA,HCV,HAV,HBV--all neg\par        3/8/19: Feeling better, less nausea, eating, TB=2.6, BVQ=143, ALT=213, RKA=142, CRP=.42\par        3/18/19: TB=1.2, YDG=136, ALT=111, AST=45, ESR=36, CRP=0.14\par        3/22/19: TB=0.9, ALP=85, ALT=70, AST=38, ESR=28, CRP=0.21;  BMs: # 4 qd, bo=595\par        4/26/19: TB=0.5, ALP=58, ALT=25,AST=19, ESR=46, CRP=0.6, BMs: #4qd, tk=049\par        6/7/19: ESR=35, CRP=0.73, Hgb=14, pr=631, BMs: #4qd\par        7/9/19: ESR= 23  , CRP= .19   , Hgb=15.6, B12<150, & TSH, FA, Hep BsAb/Bc Ab, Quantiferon          all neg , xe=773, BMs: #4 ,. no bleeding \par        7/23/19:ESR=10, CRP=0.11, Hgb=14.5, Tj=231, BMs: #4qd\par        8/8/19: ESR=29, to=611, BMs: #4 qd, no brbpr\par        9/10/19: ESR=18, CRP=0.27, Hgb=15, tp=237, BMS:# 4qd\par \par Today: feeling well, no pain, N, diarrhea\par       BMs: # 4 qd, wt=250____\par        blood-- no\par        mucous--no\par        Abd cramping-no\par        Diarrhea--no\par        Bloat-no \par        BRBPR-no\par        Htburn-no\par        Dysphagia-no \par        N/V--no\par        Early satiety--no\par        Belching--no\par        Constipation--no\par        Melena--no\par        Rash--no\par        Oral ulcers--no\par \par \par \par        PRIOR HISTORY:\par        12/20/13: 2 weeks diarrhea 4-5x/D, mild cramps, scant blood\par        WBC=29K, CRP=30, ESR=12, stool Cdiff--neg\par        Uceris 9, Asacol HD 3 tid\par        5/15/15: Uceris tapered off a week prior, Lialda 2/1/2; HGB=15.8, CRP=7, ESR=6\par        8/2015: Sinus infection\par        1/8/16 : Hgb=15.9, CRP=8.2, ESR=18, BMs:# 4qd, no pain, no brbpr\par        5/5/17:well on Lialda 4 qd , New Ins--dosent cover Lialda\par        BMs:# 4, 1-2x/D, CRP=10.6, ESR=23, CBC, CMET--wnl.

## 2019-09-27 NOTE — ASSESSMENT
[FreeTextEntry1] : \par Nausea: intermittent--resolved\par Assoc malaise, anorexia--better\par Regurg/ dyspepsia--better\par Hepatitis: TB is trending down, transaminases are normal\par 6 MP toxicity--d/c on 2/28\par Hep Serologies are neg; amylase/lipase--wnl\par some  GERD\par \par \par P: anti-reflux, lactose free, low fat\par    Pepcid 40 bid--> bid  alt w 1 qd --> prn \par    No NDSAIDs/ETOH\par    Hold 6MP--> D/C \par \par \par \par \par \par 1. Ulcerative pancolitis without complication  : stable , No cramps, mucous/blood pr \par 10/5/17 w recent flare probably food borne, ESR=25, CRP=52--> 11/28/17: ESR=21, CRP=15-->12/21/17: ESR=22,CRP=39-->2/9/18: ESR=11, CRP<5 ->3/23/18: ESR=11,CRP=7-->\par 5/4/18: ESR=28, CRP=17-->7/31/18: ESR=38, CRP=9.7-->9/14/18: ESR=15, CRP<5-->10/12/18: ESR=11, CRP=6.2-->\par 11/9/18: ESR=13,  CRP=6--> 11/30/18: ESR=20, CRP=2.7--> 1/4/19: ESR=16, CRP=0.4-->2/8/19: ESR=20, CRP=0.7-->3/8/19: CRP=0.42--> 3/18/19: CRP=.14, ESR=36-->3/22/19: CRP=0.21, ESR=28--> 4/22/19: CRP=0.6, ESR=46--> 6/7/19: CRP=0.73, ESR=35-->7/9/19: CRP=.19, ESR=23-->7/23/19: CRP=0.11, ESR=10-->8/8/19: ESR=29-->9/10/19:ESR=18, CRP=0.27\par Mild-Moderate Pancolitis-10/2010\par 3/2013: Sigmoid Inflammatory Polyps\par 8/13/18 Colonoscopy: mild-mod proctosigmoiditis--patchy\par 6 MP 50 mg qd: 8/23/18--> 75mg qd on 9/14/18--> 75mg alt w 100mg on 9/28/18-->100mg on 10/12/18-->100mg alt w 125mg on 11/11/18--> 125mg qd on 11/30 /18-->125mg alt w 150 on 1/4/19--> 150mg--> d/c\par Will need Humira, once LFTs normalized but  Inflamm markers up:Load w 160, 80, 40 qow \par 160mg on 7/9/19, 80mg on 7/23/19, 40mg qow\par Discussed the risk and benefits including infections, lymphoma\par Uceris 9mg qd tapered off--5/2015, restarted 9mg qd 12/25/17--> 3/23/18: 1 qod-->6/11/19: 1 qd-->9/27/19 1 qod\par Lialda 4 qd--> Apriso 3 grm 2/2 Ins--~2/2/18\par Flax seed Oil 3, Probiotic 3\par check labs--cbc,esr,crp,\par 5/4/18: TPMT =26.6\par Colonoscopy--surveillance: 8/2020.   \par \par \par \par \par 2. Diverticulosis of large intestine w/o  hemorrhage  :well controlled, no pain, infection,bleeding\par Discussed the potential complications of perforation, pain, infection, bleeding.\par Moderate -Fiber Diet was reviewed and emphasized.\par 6 - 8 cups of decaffeinated fluid daily.   \par \par \par \par \par 3. Irritable bowel syndrome with diarrhea : stable, no pain , no diarrhea, no bloat\par Moderate-Fiber diet, Low fat & lactose-free, low FODMAPS,\par increase fluid intake, reg exercise,\par Probiotics 3 daily \par \par \par \par \par 4. Gastroesophageal reflux disease w/o  esophagitis  :better w less  ht burn, No dysphagia, throat clear\par * No LPR, No Barretts w No Dysplasia , No Esophagitis grade: A \par *Anti-reflux diet and life-style changes emphasized. No Bedge. \par *Weight Reduction & reg exercise emphasized. \par * ++ PPI: Prilosec 20mg prn--> 40mg bid--> 40mg qam -->prn   , No H2B Q HS: , No Carafate 1gm: \par * No F/U EGD: (_ )mo. / (_ )yrs, \par * No pH Monitor, No Manometry, No esophagram \par * No ENT eval., No Surgical eval.   \par \par \par \par \par 5. Anemia due to chronic blood loss  & B12 Deficiency\par  stable, last Hgb in 7/19=15 , HBT=359\par B12<150\par MVI, FA 1qd, Slo-Fe 1 qd\par check cbc, WMZ=436 Homocysteine=13, IF AB & Parietal cell Abs both neg\par supplement B12--> sc and then po \par B12: R delt: #1 9/10/19: 1cc,  #2- 9/20/19: 1cc, #3- 9/27/19: 1cc\par \par \par \par \par \par 6. Internal hemorrhoids  :well - controlled, no pain, swelling, itch, bleeding \par *Discussed the potential complications of thrombosis, pain, bleeding, swelling, itching, infection. \par *High-Fiber Diet was reviewed and emphasized. \par *6 -- 8 cups of decaffeinated fluid daily \par * Sitz Bathes BID, No: Anusol HC Suppos/Cream NC BID, \par * No: Tucks BID, No Balneol Lotion, No: Calmoseptine Oint, +++ Prep H prn \par * No: Colorectal Surgical evaluation for possible ablation.   \par \par \par \par

## 2019-10-04 ENCOUNTER — APPOINTMENT (OUTPATIENT)
Dept: GASTROENTEROLOGY | Facility: CLINIC | Age: 64
End: 2019-10-04
Payer: MEDICAID

## 2019-10-04 VITALS
BODY MASS INDEX: 39.99 KG/M2 | HEIGHT: 65 IN | HEART RATE: 60 BPM | WEIGHT: 240 LBS | SYSTOLIC BLOOD PRESSURE: 142 MMHG | DIASTOLIC BLOOD PRESSURE: 92 MMHG

## 2019-10-04 PROCEDURE — 99214 OFFICE O/P EST MOD 30 MIN: CPT | Mod: 25

## 2019-10-04 PROCEDURE — 96372 THER/PROPH/DIAG INJ SC/IM: CPT

## 2019-10-04 RX ORDER — CYANOCOBALAMIN 1000 UG/ML
1000 INJECTION INTRAMUSCULAR; SUBCUTANEOUS
Qty: 0 | Refills: 0 | Status: COMPLETED | OUTPATIENT
Start: 2019-10-04

## 2019-10-04 RX ADMIN — CYANOCOBALAMIN 0 MCG/ML: 1000 INJECTION INTRAMUSCULAR; SUBCUTANEOUS at 00:00

## 2019-10-04 NOTE — HISTORY OF PRESENT ILLNESS
[de-identified] : \par \par  PCP: elaina/daron \par \par        64 yo WM h/o HTN, Obesity, IBS, GERD\par        Adm PMHC 10/2010 w Crampy abd pain, Diarrhea, Heme +, Hgb=9, WBC=21K\par        CT ABD: Diffuse wall thickening\par        Colonoscopy: Moderate Pancolitis\par        Rx Prednisone 60mg qd taper, Asacol 4 tid\par        6/17/11 completed pred taper but continued Asacol 4 tid, anemia resolved\par        3/19/13 Colonoscopy: sigmoid divers, inflammatory polyps sigmoid colon\par        Asacol HD 3 tid + Rowasa q hs\par        Late Sept 2017: had fast food hamburger and several hrs later\par        Lower abd cramping, Loose BMs: #5, 2-3x/D, Blood in stool, No fever, chills\par        10/5/17: feeling better on Lialda 4 qd\par        BMS: 4-5, 2x/D, cramps and bleeding stopped\par        Labs: WBC=8.9, Hgb=15, MCV=90, IMGs=0.6%, ESR=25, CRP=52, CMET=wnl\par        11/28/17:on Lialda 4 qd, occas cramp, BMs:# 4-5, 2-3x/D, occas # 6\par        Labs: Hgb=15, ESR=21, CRP=15.8\par        12/21/17:yesterday pm, cramping, BMs: 5, 1-3 x/d, w blood and mucous\par        This am sl cramping, no blood but mucous\par        WBC=10, CRP=39, ESR=22\par        Uceris 9mg qd started on 12/25/17, Lialda--> [employer] 3grm 2/2 Ins coverage about 1 week ago\par        2/9/18: BMs: #4-5, 1-2 x D, ESR=11, CRP<5\par        3/23/18: BMs: #4, 2 x D,no pain brbpr, ESR=11, CRP=7.4\par        5/4/18: BMs: # 4 qd, no pain or blood, ESR=28, CRP=17\par        6/15/18: BMs:#4 qd, no pain or blood\par        7/31/18: was feeling well on Uceris qod, thinks had bug ~ 1 week ago w ? chill, sl cramping, 1 day brbpr/mucous then resolved.\par        ESR=38, CRP=9.9\par        8/13/18 Colonoscopy: mild-mod patchy proctosigmoiditis--worse distal rectum, mod sigmoid diverticulosis, 0.5cm rectal polyp: HP, 2nd deg Hemorrhoids.\par        8/23/18: 6 MP 50mg qd, 9/14/18: Hgb=14.9, MCV=94, ESR=15, CRP<5\par        9/28/18: 6MP 75mg qd, Hgb=16.7, MCV=96, ESR=11, CRP=6.2\par        11/9/18: 6MP 100mg qd, Hgb=16, MCV=94, ESR=13, CRP=6\par        11/30/18: 6MP 100mg qd alt w 125mg x  3 weeks, Hgb=15, MCV=96, ESR=20, CRP=2.7\par        1/4/19: 6MP 125mg \par         2/8/19: Hgb=15, QTL=363, ESR=20, CRP=0.7 on 6MP 125mg qd alt w 150 x 4 weeks \par         2/28/19:last wk some nausea, no pain, + malaise, no fever/chills/diarrhea, not hungry, \par        ate late the other night, awoke w throat burn, cough--> regurg\par        Also some Nausea/churning of stomach during the day--felt better w pepcid\par        No change in stool, no rash, jt pain, pruritus,new travel, recent contact w sick students--\par        BMs: # 4 qd, no blood or mucous;  6-MP held \par        Labs: TB=7.4, APS=096, ALT=124,AST=90, CRP=0.42,ESR=9, Hgb=14\par        3/1/19: TB=9,YJL=271,ALT=139, ESR=33, NEREYDA,ASMA,AMA,HCV,HAV,HBV--all neg\par        3/8/19: Feeling better, less nausea, eating, TB=2.6, WWN=823, ALT=213, KST=287, CRP=.42\par        3/18/19: TB=1.2, QZL=883, ALT=111, AST=45, ESR=36, CRP=0.14\par        3/22/19: TB=0.9, ALP=85, ALT=70, AST=38, ESR=28, CRP=0.21;  BMs: # 4 qd, qc=525\par        4/26/19: TB=0.5, ALP=58, ALT=25,AST=19, ESR=46, CRP=0.6, BMs: #4qd, ur=985\par        6/7/19: ESR=35, CRP=0.73, Hgb=14, zx=784, BMs: #4qd\par        7/9/19: ESR= 23  , CRP= .19   , Hgb=15.6, B12<150, & TSH, FA, Hep BsAb/Bc Ab, Quantiferon          all neg , ay=739, BMs: #4 ,. no bleeding \par        7/23/19:ESR=10, CRP=0.11, Hgb=14.5, Yj=974, BMs: #4qd\par        8/8/19: ESR=29, qk=867, BMs: #4 qd, no brbpr\par        9/10/19: ESR=18, CRP=0.27, Hgb=15, si=564, BMS:# 4qd\par \par Today: feeling well, no pain, N, diarrhea\par       BMs: # 4 qd, wt=244____\par        blood-- no\par        mucous--no\par        Abd cramping-no\par        Diarrhea--no\par        Bloat-no \par        BRBPR-no\par        Htburn-no\par        Dysphagia-no \par        N/V--no\par        Early satiety--no\par        Belching--no\par        Constipation--no\par        Melena--no\par        Rash--no\par        Oral ulcers--no\par \par \par \par        PRIOR HISTORY:\par        12/20/13: 2 weeks diarrhea 4-5x/D, mild cramps, scant blood\par        WBC=29K, CRP=30, ESR=12, stool Cdiff--neg\par        Uceris 9, Asacol HD 3 tid\par        5/15/15: Uceris tapered off a week prior, Lialda 2/1/2; HGB=15.8, CRP=7, ESR=6\par        8/2015: Sinus infection\par        1/8/16 : Hgb=15.9, CRP=8.2, ESR=18, BMs:# 4qd, no pain, no brbpr\par        5/5/17:well on Lialda 4 qd , New Ins--dosent cover Lialda\par        BMs:# 4, 1-2x/D, CRP=10.6, ESR=23, CBC, CMET--wnl.

## 2019-10-04 NOTE — ASSESSMENT
[FreeTextEntry1] : \par Nausea: intermittent--resolved\par Assoc malaise, anorexia--better\par Regurg/ dyspepsia--better\par Hepatitis: TB is trending down, transaminases are normal\par 6 MP toxicity--d/c on 2/28\par Hep Serologies are neg; amylase/lipase--wnl\par some  GERD\par \par \par P: anti-reflux, lactose free, low fat\par    Pepcid 40 bid--> bid  alt w 1 qd --> prn \par    No NDSAIDs/ETOH\par    Hold 6MP--> D/C \par \par \par \par \par \par 1. Ulcerative pancolitis without complication  : stable , No cramps, mucous/blood pr \par 10/5/17 w recent flare probably food borne, ESR=25, CRP=52--> 11/28/17: ESR=21, CRP=15-->12/21/17: ESR=22,CRP=39-->2/9/18: ESR=11, CRP<5 ->3/23/18: ESR=11,CRP=7-->\par 5/4/18: ESR=28, CRP=17-->7/31/18: ESR=38, CRP=9.7-->9/14/18: ESR=15, CRP<5-->10/12/18: ESR=11, CRP=6.2-->\par 11/9/18: ESR=13,  CRP=6--> 11/30/18: ESR=20, CRP=2.7--> 1/4/19: ESR=16, CRP=0.4-->2/8/19: ESR=20, CRP=0.7-->3/8/19: CRP=0.42--> 3/18/19: CRP=.14, ESR=36-->3/22/19: CRP=0.21, ESR=28--> 4/22/19: CRP=0.6, ESR=46--> 6/7/19: CRP=0.73, ESR=35-->7/9/19: CRP=.19, ESR=23-->7/23/19: CRP=0.11, ESR=10-->8/8/19: ESR=29-->9/10/19:ESR=18, CRP=0.27\par Mild-Moderate Pancolitis-10/2010\par 3/2013: Sigmoid Inflammatory Polyps\par 8/13/18 Colonoscopy: mild-mod proctosigmoiditis--patchy\par 6 MP 50 mg qd: 8/23/18--> 75mg qd on 9/14/18--> 75mg alt w 100mg on 9/28/18-->100mg on 10/12/18-->100mg alt w 125mg on 11/11/18--> 125mg qd on 11/30 /18-->125mg alt w 150 on 1/4/19--> 150mg--> d/c\par Will need Humira, once LFTs normalized but  Inflamm markers up:Load w 160, 80, 40 qow \par 160mg on 7/9/19, 80mg on 7/23/19, 40mg qow, tool today 10/4/19\par Discussed the risk and benefits including infections, lymphoma\par Uceris 9mg qd tapered off--5/2015, restarted 9mg qd 12/25/17--> 3/23/18: 1 qod-->6/11/19: 1 qd-->9/27/19 1 qod-->10/4/19: take 1 skip 2D\par Lialda 4 qd--> Apr[employer] 3 grm 2/2 Ins--~2/2/18\par Flax seed Oil 3, Probiotic 3\par check labs--cbc,esr,crp,\par 5/4/18: TPMT =26.6\par Colonoscopy--surveillance: 8/2020.   \par Flu shot L delt 10/4/19\par \par \par \par \par 2. Diverticulosis of large intestine w/o  hemorrhage  :well controlled, no pain, infection,bleeding\par Discussed the potential complications of perforation, pain, infection, bleeding.\par Moderate -Fiber Diet was reviewed and emphasized.\par 6 - 8 cups of decaffeinated fluid daily.   \par \par \par \par \par 3. Irritable bowel syndrome with diarrhea : stable, no pain , no diarrhea, no bloat\par Moderate-Fiber diet, Low fat & lactose-free, low FODMAPS,\par increase fluid intake, reg exercise,\par Probiotics 3 daily \par \par \par \par \par 4. Gastroesophageal reflux disease w/o  esophagitis  :better w less  ht burn, No dysphagia, throat clear\par * No LPR, No Barretts w No Dysplasia , No Esophagitis grade: A \par *Anti-reflux diet and life-style changes emphasized. No Bedge. \par *Weight Reduction & reg exercise emphasized. \par * ++ PPI: Prilosec 20mg prn--> 40mg bid--> 40mg qam -->prn   , No H2B Q HS: , No Carafate 1gm: \par * No F/U EGD: (_ )mo. / (_ )yrs, \par * No pH Monitor, No Manometry, No esophagram \par * No ENT eval., No Surgical eval.   \par \par \par \par \par 5. Anemia due to chronic blood loss  & B12 Deficiency\par  stable, last Hgb in 7/19=15 , ING=296\par B12<150\par MVI, FA 1qd, Slo-Fe 1 qd\par check cbc, ZHC=388 Homocysteine=13, IF AB & Parietal cell Abs both neg\par supplement B12--> sc and then po \par B12: R delt: #1 9/10/19: 1cc,  #2- 9/20/19: 1cc, #3- 9/27/19: 1cc, #4-10/4/19: 1cc R\par \par \par \par \par \par 6. Internal hemorrhoids  :well - controlled, no pain, swelling, itch, bleeding \par *Discussed the potential complications of thrombosis, pain, bleeding, swelling, itching, infection. \par *High-Fiber Diet was reviewed and emphasized. \par *6 -- 8 cups of decaffeinated fluid daily \par * Sitz Bathes BID, No: Anusol HC Suppos/Cream NM BID, \par * No: Tucks BID, No Balneol Lotion, No: Calmoseptine Oint, +++ Prep H prn \par * No: Colorectal Surgical evaluation for possible ablation.   \par \par \par \par

## 2019-10-07 LAB
BASOPHILS # BLD AUTO: 0.05 K/UL
BASOPHILS NFR BLD AUTO: 0.5 %
CRP SERPL-MCNC: 3.4 MG/DL
EOSINOPHIL # BLD AUTO: 0.12 K/UL
EOSINOPHIL NFR BLD AUTO: 1.2 %
ERYTHROCYTE [SEDIMENTATION RATE] IN BLOOD BY WESTERGREN METHOD: 49 MM/HR
HCT VFR BLD CALC: 44.2 %
HGB BLD-MCNC: 14.5 G/DL
IMM GRANULOCYTES NFR BLD AUTO: 1 %
LYMPHOCYTES # BLD AUTO: 1.65 K/UL
LYMPHOCYTES NFR BLD AUTO: 15.9 %
MAN DIFF?: NORMAL
MCHC RBC-ENTMCNC: 32.8 GM/DL
MCHC RBC-ENTMCNC: 33 PG
MCV RBC AUTO: 100.5 FL
MONOCYTES # BLD AUTO: 0.89 K/UL
MONOCYTES NFR BLD AUTO: 8.6 %
NEUTROPHILS # BLD AUTO: 7.58 K/UL
NEUTROPHILS NFR BLD AUTO: 72.8 %
PLATELET # BLD AUTO: 200 K/UL
RBC # BLD: 4.4 M/UL
RBC # FLD: 14.1 %
WBC # FLD AUTO: 10.39 K/UL

## 2019-10-10 ENCOUNTER — APPOINTMENT (OUTPATIENT)
Dept: GASTROENTEROLOGY | Facility: CLINIC | Age: 64
End: 2019-10-10
Payer: MEDICAID

## 2019-10-10 VITALS
WEIGHT: 240 LBS | HEIGHT: 65 IN | BODY MASS INDEX: 39.99 KG/M2 | HEART RATE: 66 BPM | SYSTOLIC BLOOD PRESSURE: 138 MMHG | DIASTOLIC BLOOD PRESSURE: 90 MMHG

## 2019-10-10 PROCEDURE — 96372 THER/PROPH/DIAG INJ SC/IM: CPT

## 2019-10-10 PROCEDURE — 99213 OFFICE O/P EST LOW 20 MIN: CPT | Mod: 25

## 2019-10-10 RX ORDER — CYANOCOBALAMIN 1000 UG/ML
1000 INJECTION INTRAMUSCULAR; SUBCUTANEOUS
Qty: 0 | Refills: 0 | Status: COMPLETED | OUTPATIENT
Start: 2019-10-10

## 2019-10-10 RX ADMIN — CYANOCOBALAMIN 0 MCG/ML: 1000 INJECTION INTRAMUSCULAR; SUBCUTANEOUS at 00:00

## 2019-10-10 NOTE — HISTORY OF PRESENT ILLNESS
[de-identified] : \par \par  PCP: elaina/daron \par \par        62 yo WM h/o HTN, Obesity, IBS, GERD\par        Adm [facility]HC 10/2010 w Crampy abd pain, Diarrhea, Heme +, Hgb=9, WBC=21K\par        CT ABD: Diffuse wall thickening\par        Colonoscopy: Moderate Pancolitis\par        Rx Prednisone 60mg qd taper, Asacol 4 tid\par        6/17/11 completed pred taper but continued Asacol 4 tid, anemia resolved\par        3/19/13 Colonoscopy: sigmoid divers, inflammatory polyps sigmoid colon\par        Asacol HD 3 tid + Rowasa q hs\par        Late Sept 2017: had fast food hamburger and several hrs later\par        Lower abd cramping, Loose BMs: #5, 2-3x/D, Blood in stool, No fever, chills\par        10/5/17: feeling better on Lialda 4 qd\par        BMS: 4-5, 2x/D, cramps and bleeding stopped\par        Labs: WBC=8.9, Hgb=15, MCV=90, IMGs=0.6%, ESR=25, CRP=52, CMET=wnl\par        11/28/17:on Lialda 4 qd, occas cramp, BMs:# 4-5, 2-3x/D, occas # 6\par        Labs: Hgb=15, ESR=21, CRP=15.8\par        12/21/17:yesterday pm, cramping, BMs: 5, 1-3 x/d, w blood and mucous\par        This am sl cramping, no blood but mucous\par        WBC=10, CRP=39, ESR=22\par        Uceris 9mg qd started on 12/25/17, Lialda--> [employer] 3grm 2/2 Ins coverage about 1 week ago\par        2/9/18: BMs: #4-5, 1-2 x D, ESR=11, CRP<5\par        3/23/18: BMs: #4, 2 x D,no pain brbpr, ESR=11, CRP=7.4\par        5/4/18: BMs: # 4 qd, no pain or blood, ESR=28, CRP=17\par        6/15/18: BMs:#4 qd, no pain or blood\par        7/31/18: was feeling well on Uceris qod, thinks had bug ~ 1 week ago w ? chill, sl cramping, 1 day brbpr/mucous then resolved.\par        ESR=38, CRP=9.9\par        8/13/18 Colonoscopy: mild-mod patchy proctosigmoiditis--worse distal rectum, mod sigmoid diverticulosis, 0.5cm rectal polyp: HP, 2nd deg Hemorrhoids.\par        8/23/18: 6 MP 50mg qd, 9/14/18: Hgb=14.9, MCV=94, ESR=15, CRP<5\par        9/28/18: 6MP 75mg qd, Hgb=16.7, MCV=96, ESR=11, CRP=6.2\par        11/9/18: 6MP 100mg qd, Hgb=16, MCV=94, ESR=13, CRP=6\par        11/30/18: 6MP 100mg qd alt w 125mg x  3 weeks, Hgb=15, MCV=96, ESR=20, CRP=2.7\par        1/4/19: 6MP 125mg \par         2/8/19: Hgb=15, PRC=031, ESR=20, CRP=0.7 on 6MP 125mg qd alt w 150 x 4 weeks \par         2/28/19:last wk some nausea, no pain, + malaise, no fever/chills/diarrhea, not hungry, \par        ate late the other night, awoke w throat burn, cough--> regurg\par        Also some Nausea/churning of stomach during the day--felt better w pepcid\par        No change in stool, no rash, jt pain, pruritus,new travel, recent contact w sick students--\par        BMs: # 4 qd, no blood or mucous;  6-MP held \par        Labs: TB=7.4, ZQN=103, ALT=124,AST=90, CRP=0.42,ESR=9, Hgb=14\par        3/1/19: TB=9,EMK=039,ALT=139, ESR=33, NEREYDA,ASMA,AMA,HCV,HAV,HBV--all neg\par        3/8/19: Feeling better, less nausea, eating, TB=2.6, YBD=267, ALT=213, XOM=091, CRP=.42\par        3/18/19: TB=1.2, RCC=587, ALT=111, AST=45, ESR=36, CRP=0.14\par        3/22/19: TB=0.9, ALP=85, ALT=70, AST=38, ESR=28, CRP=0.21;  BMs: # 4 qd, qp=034\par        4/26/19: TB=0.5, ALP=58, ALT=25,AST=19, ESR=46, CRP=0.6, BMs: #4qd, vm=954\par        6/7/19: ESR=35, CRP=0.73, Hgb=14, bf=412, BMs: #4qd\par        7/9/19: ESR= 23  , CRP= .19   , Hgb=15.6, B12<150, & TSH, FA, Hep BsAb/Bc Ab, Quantiferon          all neg , xr=490, BMs: #4 ,. no bleeding \par        7/23/19:ESR=10, CRP=0.11, Hgb=14.5, Ds=513, BMs: #4qd\par        8/8/19: ESR=29, oo=243, BMs: #4 qd, no brbpr\par        9/10/19: ESR=18, CRP=0.27, Hgb=15, iu=060, BMS:# 4qd\par        10/4/19; ESR=49, CRP=3.4, Hgb=14, gp=776, BMs:# 4qd \par \par Today: feeling well, no pain, N, diarrhea; 1 1/2 weeks had ? bug, achey, low grade fever, no chills, sorethroat, cough\par       BMs: # 4 qd, wt=244___\par        blood-- no\par        mucous--no\par        Abd cramping-no\par        Diarrhea--no\par        Bloat-no \par        BRBPR-no\par        Htburn-no\par        Dysphagia-no \par        N/V--no\par        Early satiety--no\par        Belching--no\par        Constipation--no\par        Melena--no\par        Rash--no\par        Oral ulcers--no\par \par \par \par        PRIOR HISTORY:\par        12/20/13: 2 weeks diarrhea 4-5x/D, mild cramps, scant blood\par        WBC=29K, CRP=30, ESR=12, stool Cdiff--neg\par        Uceris 9, Asacol HD 3 tid\par        5/15/15: Uceris tapered off a week prior, Lialda 2/1/2; HGB=15.8, CRP=7, ESR=6\par        8/2015: Sinus infection\par        1/8/16 : Hgb=15.9, CRP=8.2, ESR=18, BMs:# 4qd, no pain, no brbpr\par        5/5/17:well on Lialda 4 qd , New Ins--dosent cover Lialda\par        BMs:# 4, 1-2x/D, CRP=10.6, ESR=23, CBC, CMET--wnl.

## 2019-10-11 LAB
ALBUMIN SERPL ELPH-MCNC: 4 G/DL
ALP BLD-CCNC: 54 U/L
ALT SERPL-CCNC: 20 U/L
ANION GAP SERPL CALC-SCNC: 17 MMOL/L
AST SERPL-CCNC: 13 U/L
BASOPHILS # BLD AUTO: 0.05 K/UL
BASOPHILS NFR BLD AUTO: 0.5 %
BILIRUB SERPL-MCNC: 0.2 MG/DL
BUN SERPL-MCNC: 17 MG/DL
CALCIUM SERPL-MCNC: 9 MG/DL
CHLORIDE SERPL-SCNC: 101 MMOL/L
CO2 SERPL-SCNC: 24 MMOL/L
CREAT SERPL-MCNC: 0.99 MG/DL
CRP SERPL-MCNC: 0.86 MG/DL
EOSINOPHIL # BLD AUTO: 0.07 K/UL
EOSINOPHIL NFR BLD AUTO: 0.7 %
ERYTHROCYTE [SEDIMENTATION RATE] IN BLOOD BY WESTERGREN METHOD: 34 MM/HR
GLUCOSE SERPL-MCNC: 154 MG/DL
HCT VFR BLD CALC: 41.9 %
HGB BLD-MCNC: 13.8 G/DL
IMM GRANULOCYTES NFR BLD AUTO: 1 %
LYMPHOCYTES # BLD AUTO: 2.07 K/UL
LYMPHOCYTES NFR BLD AUTO: 21.9 %
MAN DIFF?: NORMAL
MCHC RBC-ENTMCNC: 32.8 PG
MCHC RBC-ENTMCNC: 32.9 GM/DL
MCV RBC AUTO: 99.5 FL
MONOCYTES # BLD AUTO: 0.76 K/UL
MONOCYTES NFR BLD AUTO: 8.1 %
NEUTROPHILS # BLD AUTO: 6.4 K/UL
NEUTROPHILS NFR BLD AUTO: 67.8 %
PLATELET # BLD AUTO: 199 K/UL
POTASSIUM SERPL-SCNC: 3.1 MMOL/L
PROT SERPL-MCNC: 6.5 G/DL
RBC # BLD: 4.21 M/UL
RBC # FLD: 13.9 %
SODIUM SERPL-SCNC: 142 MMOL/L
WBC # FLD AUTO: 9.44 K/UL

## 2019-10-18 ENCOUNTER — APPOINTMENT (OUTPATIENT)
Dept: GASTROENTEROLOGY | Facility: CLINIC | Age: 64
End: 2019-10-18
Payer: MEDICAID

## 2019-10-18 VITALS
SYSTOLIC BLOOD PRESSURE: 136 MMHG | DIASTOLIC BLOOD PRESSURE: 88 MMHG | BODY MASS INDEX: 39.99 KG/M2 | HEIGHT: 65 IN | HEART RATE: 68 BPM | WEIGHT: 240 LBS

## 2019-10-18 PROCEDURE — 96372 THER/PROPH/DIAG INJ SC/IM: CPT

## 2019-10-18 PROCEDURE — 99213 OFFICE O/P EST LOW 20 MIN: CPT | Mod: 25

## 2019-10-18 RX ORDER — CYANOCOBALAMIN 1000 UG/ML
1000 INJECTION INTRAMUSCULAR; SUBCUTANEOUS
Qty: 0 | Refills: 0 | Status: COMPLETED | OUTPATIENT
Start: 2019-10-18

## 2019-10-18 RX ADMIN — CYANOCOBALAMIN 0 MCG/ML: 1000 INJECTION INTRAMUSCULAR; SUBCUTANEOUS at 00:00

## 2019-10-18 NOTE — HISTORY OF PRESENT ILLNESS
[de-identified] : \par \par  PCP: elaina/daron \par \par        62 yo WM h/o HTN, Obesity, IBS, GERD\par        Adm [facility]HC 10/2010 w Crampy abd pain, Diarrhea, Heme +, Hgb=9, WBC=21K\par        CT ABD: Diffuse wall thickening\par        Colonoscopy: Moderate Pancolitis\par        Rx Prednisone 60mg qd taper, Asacol 4 tid\par        6/17/11 completed pred taper but continued Asacol 4 tid, anemia resolved\par        3/19/13 Colonoscopy: sigmoid divers, inflammatory polyps sigmoid colon\par        Asacol HD 3 tid + Rowasa q hs\par        Late Sept 2017: had fast food hamburger and several hrs later\par        Lower abd cramping, Loose BMs: #5, 2-3x/D, Blood in stool, No fever, chills\par        10/5/17: feeling better on Lialda 4 qd\par        BMS: 4-5, 2x/D, cramps and bleeding stopped\par        Labs: WBC=8.9, Hgb=15, MCV=90, IMGs=0.6%, ESR=25, CRP=52, CMET=wnl\par        11/28/17:on Lialda 4 qd, occas cramp, BMs:# 4-5, 2-3x/D, occas # 6\par        Labs: Hgb=15, ESR=21, CRP=15.8\par        12/21/17:yesterday pm, cramping, BMs: 5, 1-3 x/d, w blood and mucous\par        This am sl cramping, no blood but mucous\par        WBC=10, CRP=39, ESR=22\par        Uceris 9mg qd started on 12/25/17, Lialda--> [employer] 3grm 2/2 Ins coverage about 1 week ago\par        2/9/18: BMs: #4-5, 1-2 x D, ESR=11, CRP<5\par        3/23/18: BMs: #4, 2 x D,no pain brbpr, ESR=11, CRP=7.4\par        5/4/18: BMs: # 4 qd, no pain or blood, ESR=28, CRP=17\par        6/15/18: BMs:#4 qd, no pain or blood\par        7/31/18: was feeling well on Uceris qod, thinks had bug ~ 1 week ago w ? chill, sl cramping, 1 day brbpr/mucous then resolved.\par        ESR=38, CRP=9.9\par        8/13/18 Colonoscopy: mild-mod patchy proctosigmoiditis--worse distal rectum, mod sigmoid diverticulosis, 0.5cm rectal polyp: HP, 2nd deg Hemorrhoids.\par        8/23/18: 6 MP 50mg qd, 9/14/18: Hgb=14.9, MCV=94, ESR=15, CRP<5\par        9/28/18: 6MP 75mg qd, Hgb=16.7, MCV=96, ESR=11, CRP=6.2\par        11/9/18: 6MP 100mg qd, Hgb=16, MCV=94, ESR=13, CRP=6\par        11/30/18: 6MP 100mg qd alt w 125mg x  3 weeks, Hgb=15, MCV=96, ESR=20, CRP=2.7\par        1/4/19: 6MP 125mg \par         2/8/19: Hgb=15, FQH=809, ESR=20, CRP=0.7 on 6MP 125mg qd alt w 150 x 4 weeks \par         2/28/19:last wk some nausea, no pain, + malaise, no fever/chills/diarrhea, not hungry, \par        ate late the other night, awoke w throat burn, cough--> regurg\par        Also some Nausea/churning of stomach during the day--felt better w pepcid\par        No change in stool, no rash, jt pain, pruritus,new travel, recent contact w sick students--\par        BMs: # 4 qd, no blood or mucous;  6-MP held \par        Labs: TB=7.4, NAP=256, ALT=124,AST=90, CRP=0.42,ESR=9, Hgb=14\par        3/1/19: TB=9,AUC=981,ALT=139, ESR=33, NEREYDA,ASMA,AMA,HCV,HAV,HBV--all neg\par        3/8/19: Feeling better, less nausea, eating, TB=2.6, EBE=933, ALT=213, LRN=866, CRP=.42\par        3/18/19: TB=1.2, FWU=300, ALT=111, AST=45, ESR=36, CRP=0.14\par        3/22/19: TB=0.9, ALP=85, ALT=70, AST=38, ESR=28, CRP=0.21;  BMs: # 4 qd, ih=793\par        4/26/19: TB=0.5, ALP=58, ALT=25,AST=19, ESR=46, CRP=0.6, BMs: #4qd, xu=118\par        6/7/19: ESR=35, CRP=0.73, Hgb=14, sv=390, BMs: #4qd\par        7/9/19: ESR= 23  , CRP= .19   , Hgb=15.6, B12<150, & TSH, FA, Hep BsAb/Bc Ab, Quantiferon          all neg , qu=825, BMs: #4 ,. no bleeding \par        7/23/19:ESR=10, CRP=0.11, Hgb=14.5, Pj=710, BMs: #4qd\par        8/8/19: ESR=29, hl=228, BMs: #4 qd, no brbpr\par        9/10/19: ESR=18, CRP=0.27, Hgb=15, zp=456, BMS:# 4qd\par        10/4/19; ESR=49, CRP=3.4, Hgb=14, zr=212, BMs:# 4qd \par       10/10/19: ESR=34, CRP=0.86, jo=152,BMs:# 4qd , 1 1/2 weeks ago had ? bug, achey, low grade fever, no chills, sorethroat, cough\par \par Today: feeling well, no pain, N, diarrhea; \par       BMs: # 4 qd, wt=244___\par        blood-- no\par        mucous--no\par        Abd cramping-no\par        Diarrhea--no\par        Bloat-no \par        BRBPR-no\par        Htburn-no\par        Dysphagia-no \par        N/V--no\par        Early satiety--no\par        Belching--no\par        Constipation--no\par        Melena--no\par        Rash--no\par        Oral ulcers--no\par \par \par \par        PRIOR HISTORY:\par        12/20/13: 2 weeks diarrhea 4-5x/D, mild cramps, scant blood\par        WBC=29K, CRP=30, ESR=12, stool Cdiff--neg\par        Uceris 9, Asacol HD 3 tid\par        5/15/15: Uceris tapered off a week prior, Lialda 2/1/2; HGB=15.8, CRP=7, ESR=6\par        8/2015: Sinus infection\par        1/8/16 : Hgb=15.9, CRP=8.2, ESR=18, BMs:# 4qd, no pain, no brbpr\par        5/5/17:well on Lialda 4 qd , New Ins--dosent cover Lialda\par        BMs:# 4, 1-2x/D, CRP=10.6, ESR=23, CBC, CMET--wnl.

## 2019-10-18 NOTE — ASSESSMENT
[FreeTextEntry1] : \par Nausea: intermittent--resolved\par Assoc malaise, anorexia--better\par Regurg/ dyspepsia--better\par Hepatitis: TB is trending down, transaminases are normal\par 6 MP toxicity--d/c on 2/28\par Hep Serologies are neg; amylase/lipase--wnl\par some  GERD\par \par \par P: anti-reflux, lactose free, low fat\par    Pepcid 40 bid--> bid  alt w 1 qd --> prn \par    No NDSAIDs/ETOH\par    Hold 6MP--> D/C \par \par \par \par \par \par 1. Ulcerative pancolitis without complication  : stable , No cramps, mucous/blood pr \par 10/5/17 w recent flare probably food borne, ESR=25, CRP=52--> 11/28/17: ESR=21, CRP=15-->12/21/17: ESR=22,CRP=39-->2/9/18: ESR=11, CRP<5 ->3/23/18: ESR=11,CRP=7-->\par 5/4/18: ESR=28, CRP=17-->7/31/18: ESR=38, CRP=9.7-->9/14/18: ESR=15, CRP<5-->10/12/18: ESR=11, CRP=6.2-->\par 11/9/18: ESR=13,  CRP=6--> 11/30/18: ESR=20, CRP=2.7--> 1/4/19: ESR=16, CRP=0.4-->2/8/19: ESR=20, CRP=0.7-->3/8/19: CRP=0.42--> 3/18/19: CRP=.14, ESR=36-->3/22/19: CRP=0.21, ESR=28--> 4/22/19: CRP=0.6, ESR=46--> 6/7/19: CRP=0.73, ESR=35-->7/9/19: CRP=.19, ESR=23-->7/23/19: CRP=0.11, ESR=10-->8/8/19: ESR=29-->9/10/19:ESR=18, CRP=0.27-->10/4/19:ESR=49, CRP=3.4 --> 10/10/19: ESR=34, CRP=0.86 ? flare vs ? bug,\par will recheck today + Humira / Ab  level\par Mild-Moderate Pancolitis-10/2010\par 3/2013: Sigmoid Inflammatory Polyps\par 8/13/18 Colonoscopy: mild-mod proctosigmoiditis--patchy\par 6 MP 50 mg qd: 8/23/18--> 75mg qd on 9/14/18--> 75mg alt w 100mg on 9/28/18-->100mg on 10/12/18-->100mg alt w 125mg on 11/11/18--> 125mg qd on 11/30 /18-->125mg alt w 150 on 1/4/19--> 150mg--> d/c\par Will need Humira, once LFTs normalized but  Inflamm markers up:Load w 160, 80, 40 qow \par 160mg on 7/9/19, 80mg on 7/23/19, 40mg qow, tool today 10/4/19\par Discussed the risk and benefits including infections, lymphoma\par Uceris 9mg qd tapered off--5/2015, restarted 9mg qd 12/25/17--> 3/23/18: 1 qod-->6/11/19: 1 qd-->9/27/19 1 qod-->10/4/19: take 1 skip 2D--will check labs today\par re check esr/crp &  will Check ADA and Ab levels at trough \par Lialda 4 qd--> Apriso 3 grm 2/2 Ins--~2/2/18\par Flax seed Oil 3, Probiotic 3\par check labs--cbc,esr,crp,\par 5/4/18: TPMT =26.6\par Colonoscopy--surveillance: 8/2020.   \par Flu shot L delt 10/4/19\par \par \par \par \par 2. Diverticulosis of large intestine w/o  hemorrhage  :well controlled, no pain, infection,bleeding\par Discussed the potential complications of perforation, pain, infection, bleeding.\par Moderate -Fiber Diet was reviewed and emphasized.\par 6 - 8 cups of decaffeinated fluid daily.   \par \par \par \par \par 3. Irritable bowel syndrome with diarrhea : stable, no pain , no diarrhea, no bloat\par Moderate-Fiber diet, Low fat & lactose-free, low FODMAPS,\par increase fluid intake, reg exercise,\par Probiotics 3 daily \par \par \par \par \par 4. Gastroesophageal reflux disease w/o  esophagitis  :better w less  ht burn, No dysphagia, throat clear\par * No LPR, No Barretts w No Dysplasia , No Esophagitis grade: A \par *Anti-reflux diet and life-style changes emphasized. No Bedge. \par *Weight Reduction & reg exercise emphasized. \par * ++ PPI: Prilosec 20mg prn--> 40mg bid--> 40mg qam -->prn   , No H2B Q HS: , No Carafate 1gm: \par * No F/U EGD: (_ )mo. / (_ )yrs, \par * No pH Monitor, No Manometry, No esophagram \par * No ENT eval., No Surgical eval.   \par \par \par \par \par 5. Anemia due to chronic blood loss  & B12 Deficiency\par  stable, last Hgb in 7/19=15 , SQX=414\par B12<150\par MVI, FA 1qd, Slo-Fe 1 qd\par check cbc, LYU=213 Homocysteine=13, IF AB & Parietal cell Abs both neg\par supplement B12--> sc and then po \par B12: R delt: #1 9/10/19: 1cc,  #2- 9/20/19: 1cc, #3- 9/27/19: 1cc, #4-10/4/19: 1cc R, #5-10/10: 1cc, \par #6-10/18/19:1cc\par \par \par \par \par \par 6. Internal hemorrhoids  :well - controlled, no pain, swelling, itch, bleeding \par *Discussed the potential complications of thrombosis, pain, bleeding, swelling, itching, infection. \par *High-Fiber Diet was reviewed and emphasized. \par *6 -- 8 cups of decaffeinated fluid daily \par * Sitz Bathes BID, No: Anusol HC Suppos/Cream VT BID, \par * No: Tucks BID, No Balneol Lotion, No: Calmoseptine Oint, +++ Prep H prn \par * No: Colorectal Surgical evaluation for possible ablation.   \par \par \par \par

## 2019-10-19 LAB
BASOPHILS # BLD AUTO: 0.05 K/UL
BASOPHILS NFR BLD AUTO: 0.5 %
CRP SERPL-MCNC: 1.16 MG/DL
EOSINOPHIL # BLD AUTO: 0.03 K/UL
EOSINOPHIL NFR BLD AUTO: 0.3 %
ERYTHROCYTE [SEDIMENTATION RATE] IN BLOOD BY WESTERGREN METHOD: 43 MM/HR
HCT VFR BLD CALC: 43.3 %
HGB BLD-MCNC: 14.2 G/DL
IMM GRANULOCYTES NFR BLD AUTO: 1 %
LYMPHOCYTES # BLD AUTO: 1.38 K/UL
LYMPHOCYTES NFR BLD AUTO: 13 %
MAN DIFF?: NORMAL
MCHC RBC-ENTMCNC: 32.8 GM/DL
MCHC RBC-ENTMCNC: 33.4 PG
MCV RBC AUTO: 101.9 FL
MONOCYTES # BLD AUTO: 0.71 K/UL
MONOCYTES NFR BLD AUTO: 6.7 %
NEUTROPHILS # BLD AUTO: 8.37 K/UL
NEUTROPHILS NFR BLD AUTO: 78.5 %
PLATELET # BLD AUTO: 221 K/UL
RBC # BLD: 4.25 M/UL
RBC # FLD: 13.9 %
WBC # FLD AUTO: 10.65 K/UL

## 2019-10-24 LAB
ANTIBODIES TO ADALIMUMAB (ATA) CONCENTRATION: 5.9 U/ML
PROMETHEUS ANSER ADA: NORMAL
PROMETHEUS LABORATORY FOOTER: NORMAL
SERUM ADALIMUMAB (ADA) CONCENTRATION: < 1.6 UG/ML

## 2019-10-25 ENCOUNTER — APPOINTMENT (OUTPATIENT)
Dept: GASTROENTEROLOGY | Facility: CLINIC | Age: 64
End: 2019-10-25
Payer: MEDICAID

## 2019-10-25 VITALS
DIASTOLIC BLOOD PRESSURE: 86 MMHG | SYSTOLIC BLOOD PRESSURE: 138 MMHG | BODY MASS INDEX: 39.99 KG/M2 | HEIGHT: 65 IN | HEART RATE: 72 BPM | WEIGHT: 240 LBS

## 2019-10-25 PROCEDURE — 96372 THER/PROPH/DIAG INJ SC/IM: CPT

## 2019-10-25 PROCEDURE — 99212 OFFICE O/P EST SF 10 MIN: CPT | Mod: 25

## 2019-10-25 RX ORDER — CYANOCOBALAMIN 1000 UG/ML
1000 INJECTION INTRAMUSCULAR; SUBCUTANEOUS
Qty: 0 | Refills: 0 | Status: COMPLETED | OUTPATIENT
Start: 2019-10-25

## 2019-10-25 RX ADMIN — CYANOCOBALAMIN 0 MCG/ML: 1000 INJECTION INTRAMUSCULAR; SUBCUTANEOUS at 00:00

## 2019-10-25 NOTE — HISTORY OF PRESENT ILLNESS
[de-identified] : \par \par  PCP: elaina/daron \par \par        64 yo WM h/o HTN, Obesity, IBS, GERD\par        Adm PMHC 10/2010 w Crampy abd pain, Diarrhea, Heme +, Hgb=9, WBC=21K\par        CT ABD: Diffuse wall thickening\par        Colonoscopy: Moderate Pancolitis\par        Rx Prednisone 60mg qd taper, Asacol 4 tid\par        6/17/11 completed pred taper but continued Asacol 4 tid, anemia resolved\par        3/19/13 Colonoscopy: sigmoid divers, inflammatory polyps sigmoid colon\par        Asacol HD 3 tid + Rowasa q hs\par        Late Sept 2017: had fast food hamburger and several hrs later\par        Lower abd cramping, Loose BMs: #5, 2-3x/D, Blood in stool, No fever, chills\par        10/5/17: feeling better on Lialda 4 qd\par        BMS: 4-5, 2x/D, cramps and bleeding stopped\par        Labs: WBC=8.9, Hgb=15, MCV=90, IMGs=0.6%, ESR=25, CRP=52, CMET=wnl\par        11/28/17:on Lialda 4 qd, occas cramp, BMs:# 4-5, 2-3x/D, occas # 6\par        Labs: Hgb=15, ESR=21, CRP=15.8\par        12/21/17:yesterday pm, cramping, BMs: 5, 1-3 x/d, w blood and mucous\par        This am sl cramping, no blood but mucous\par        WBC=10, CRP=39, ESR=22\par        Uceris 9mg qd started on 12/25/17, Lialda--> Apriso 3grm 2/2 Ins coverage about 1 week ago\par        2/9/18: BMs: #4-5, 1-2 x D, ESR=11, CRP<5\par        3/23/18: BMs: #4, 2 x D,no pain brbpr, ESR=11, CRP=7.4\par        5/4/18: BMs: # 4 qd, no pain or blood, ESR=28, CRP=17\par        6/15/18: BMs:#4 qd, no pain or blood\par        7/31/18: was feeling well on Uceris qod, thinks had bug ~ 1 week ago w ? chill, sl cramping, 1 day brbpr/mucous then resolved.\par        ESR=38, CRP=9.9\par        8/13/18 Colonoscopy: mild-mod patchy proctosigmoiditis--worse distal rectum, mod sigmoid diverticulosis, 0.5cm rectal polyp: HP, 2nd deg Hemorrhoids.\par        8/23/18: 6 MP 50mg qd, 9/14/18: Hgb=14.9, MCV=94, ESR=15, CRP<5\par        9/28/18: 6MP 75mg qd, Hgb=16.7, MCV=96, ESR=11, CRP=6.2\par        11/9/18: 6MP 100mg qd, Hgb=16, MCV=94, ESR=13, CRP=6\par        11/30/18: 6MP 100mg qd alt w 125mg x  3 weeks, Hgb=15, MCV=96, ESR=20, CRP=2.7\par        1/4/19: 6MP 125mg \par         2/8/19: Hgb=15, ULW=356, ESR=20, CRP=0.7 on 6MP 125mg qd alt w 150 x 4 weeks \par         2/28/19:last wk some nausea, no pain, + malaise, no fever/chills/diarrhea, not hungry, \par        ate late the other night, awoke w throat burn, cough--> regurg\par        Also some Nausea/churning of stomach during the day--felt better w pepcid\par        No change in stool, no rash, jt pain, pruritus,new travel, recent contact w sick students--\par        BMs: # 4 qd, no blood or mucous;  6-MP held \par        Labs: TB=7.4, PEV=633, ALT=124,AST=90, CRP=0.42,ESR=9, Hgb=14\par        3/1/19: TB=9,JKL=102,ALT=139, ESR=33, NEREYDA,ASMA,AMA,HCV,HAV,HBV--all neg\par        3/8/19: Feeling better, less nausea, eating, TB=2.6, HRU=084, ALT=213, UTU=524, CRP=.42\par        3/18/19: TB=1.2, NRH=317, ALT=111, AST=45, ESR=36, CRP=0.14\par        3/22/19: TB=0.9, ALP=85, ALT=70, AST=38, ESR=28, CRP=0.21;  BMs: # 4 qd, lm=254\par        4/26/19: TB=0.5, ALP=58, ALT=25,AST=19, ESR=46, CRP=0.6, BMs: #4qd, tt=373\par        6/7/19: ESR=35, CRP=0.73, Hgb=14, oe=982, BMs: #4qd\par        7/9/19: ESR= 23  , CRP= .19   , Hgb=15.6, B12<150, & TSH, FA, Hep BsAb/Bc Ab, Quantiferon          all neg , pq=715, BMs: #4 ,. no bleeding \par        7/23/19:ESR=10, CRP=0.11, Hgb=14.5, Rx=084, BMs: #4qd\par        8/8/19: ESR=29, st=761, BMs: #4 qd, no brbpr\par        9/10/19: ESR=18, CRP=0.27, Hgb=15, hu=246, BMS:# 4qd\par        10/4/19; ESR=49, CRP=3.4, Hgb=14, ic=484, BMs:# 4qd \par       10/10/19: ESR=34, CRP=0.86, ox=466,BMs:# 4qd , 1 1/2 weeks ago had ? bug, achey, low grade fever, no chills, sorethroat, cough\par        10/18/19: feeling well, hi=700,  ESR=43, CRP=1.1, ADA <1.6 ug/ml, ROHITH 5.9 U/ml\par \par Today: feeling well, no pain, N, diarrhea; \par       BMs: # 4 qd, wt=244___\par        blood-- no\par        mucous--no\par        Abd cramping-no\par        Diarrhea--no\par        Bloat-no \par        BRBPR-no\par        Htburn-no\par        Dysphagia-no \par        N/V--no\par        Early satiety--no\par        Belching--no\par        Constipation--no\par        Melena--no\par        Rash--no\par        Oral ulcers--no\par \par \par \par        PRIOR HISTORY:\par        12/20/13: 2 weeks diarrhea 4-5x/D, mild cramps, scant blood\par        WBC=29K, CRP=30, ESR=12, stool Cdiff--neg\par        Uceris 9, Asacol HD 3 tid\par        5/15/15: Uceris tapered off a week prior, Lialda 2/1/2; HGB=15.8, CRP=7, ESR=6\par        8/2015: Sinus infection\par        1/8/16 : Hgb=15.9, CRP=8.2, ESR=18, BMs:# 4qd, no pain, no brbpr\par        5/5/17:well on Lialda 4 qd , New Ins--dosent cover Lialda\par        BMs:# 4, 1-2x/D, CRP=10.6, ESR=23, CBC, CMET--wnl.

## 2019-10-25 NOTE — ASSESSMENT
[FreeTextEntry1] : \par Nausea: intermittent--resolved\par Assoc malaise, anorexia--better\par Regurg/ dyspepsia--better\par Hepatitis: TB is trending down, transaminases are normal\par 6 MP toxicity--d/c on 2/28\par Hep Serologies are neg; amylase/lipase--wnl\par some  GERD\par \par \par P: anti-reflux, lactose free, low fat\par    Pepcid 40 bid--> bid  alt w 1 qd --> prn \par    No NDSAIDs/ETOH\par    Hold 6MP--> D/C \par \par \par \par \par \par 1. Ulcerative pancolitis without complication  : stable , No cramps, mucous/blood pr \par 10/5/17 w recent flare probably food borne, ESR=25, CRP=52--> 11/28/17: ESR=21, CRP=15-->12/21/17: ESR=22,CRP=39-->2/9/18: ESR=11, CRP<5 ->3/23/18: ESR=11,CRP=7-->\par 5/4/18: ESR=28, CRP=17-->7/31/18: ESR=38, CRP=9.7-->9/14/18: ESR=15, CRP<5-->10/12/18: ESR=11, CRP=6.2-->\par 11/9/18: ESR=13,  CRP=6--> 11/30/18: ESR=20, CRP=2.7--> 1/4/19: ESR=16, CRP=0.4-->2/8/19: ESR=20, CRP=0.7-->3/8/19: CRP=0.42--> 3/18/19: CRP=.14, ESR=36-->3/22/19: CRP=0.21, ESR=28--> 4/22/19: CRP=0.6, ESR=46--> 6/7/19: CRP=0.73, ESR=35-->7/9/19: CRP=.19, ESR=23-->7/23/19: CRP=0.11, ESR=10-->8/8/19: ESR=29-->9/10/19:ESR=18, CRP=0.27-->10/4/19:ESR=49, CRP=3.4 --> 10/10/19: ESR=34, CRP=0.86 --> 10/18/19: ESR=43,CRP=1.1\par 10/18/19 Humira/ADA <1.6 ug/ml &  ROHITH Ab  level= 5.9 U/ml\par Mild-Moderate Pancolitis-10/2010\par 3/2013: Sigmoid Inflammatory Polyps\par 8/13/18 Colonoscopy: mild-mod proctosigmoiditis--patchy\par 6 MP 50 mg qd: 8/23/18--> 75mg qd on 9/14/18--> 75mg alt w 100mg on 9/28/18-->100mg on 10/12/18-->100mg alt w 125mg on 11/11/18--> 125mg qd on 11/30 /18-->125mg alt w 150 on 1/4/19--> 150mg--> d/c\par Will need Humira, once LFTs normalized but  Inflamm markers up:Load w 160, 80, 40 qow \par 160mg on 7/9/19, 80mg on 7/23/19, 40mg qow, took 10/4/19\par Discussed the risk and benefits including infections, lymphoma\par Uceris 9mg qd tapered off--5/2015, restarted 9mg qd 12/25/17--> 3/23/18: 1 qod-->6/11/19: 1 qd-->9/27/19 1 qod-->10/4/19: take 1 skip 2D-->  back to 9mg qd given elev markers & low ADA / + ROHITH Abs\par will inquire ins about switch to Remicade\par re check esr/crp \par Lialda 4 qd--> Apriso 3 grm 2/2 Ins--~2/2/18\par Flax seed Oil 3, Probiotic 3\par check labs--cbc,esr,crp,\par 5/4/18: TPMT =26.6\par Colonoscopy--surveillance: 8/2020.   \par Flu shot L delt 10/4/19\par \par \par \par \par 2. Diverticulosis of large intestine w/o  hemorrhage  :well controlled, no pain, infection,bleeding\par Discussed the potential complications of perforation, pain, infection, bleeding.\par Moderate -Fiber Diet was reviewed and emphasized.\par 6 - 8 cups of decaffeinated fluid daily.   \par \par \par \par \par 3. Irritable bowel syndrome with diarrhea : stable, no pain , no diarrhea, no bloat\par Moderate-Fiber diet, Low fat & lactose-free, low FODMAPS,\par increase fluid intake, reg exercise,\par Probiotics 3 daily \par \par \par \par \par 4. Gastroesophageal reflux disease w/o  esophagitis  :better w less  ht burn, No dysphagia, throat clear\par * No LPR, No Barretts w No Dysplasia , No Esophagitis grade: A \par *Anti-reflux diet and life-style changes emphasized. No Bedge. \par *Weight Reduction & reg exercise emphasized. \par * ++ PPI: Prilosec 20mg prn--> 40mg bid--> 40mg qam -->prn   , No H2B Q HS: , No Carafate 1gm: \par * No F/U EGD: (_ )mo. / (_ )yrs, \par * No pH Monitor, No Manometry, No esophagram \par * No ENT eval., No Surgical eval.   \par \par \par \par \par 5. Anemia due to chronic blood loss  & B12 Deficiency\par  stable, last Hgb in 7/19=15 , YDJ=979\par B12<150\par MVI, FA 1qd, Slo-Fe 1 qd\par check cbc, QCP=699 Homocysteine=13, IF AB & Parietal cell Abs both neg\par supplement B12--> sc and then po \par B12: R delt: #1 9/10/19: 1cc,  #2- 9/20/19: 1cc, #3- 9/27/19: 1cc, #4-10/4/19: 1cc R, #5-10/10: 1cc, \par #6-10/18/19:1cc, #7-10/25/19: 1cc\par \par \par \par \par \par 6. Internal hemorrhoids  :well - controlled, no pain, swelling, itch, bleeding \par *Discussed the potential complications of thrombosis, pain, bleeding, swelling, itching, infection. \par *High-Fiber Diet was reviewed and emphasized. \par *6 -- 8 cups of decaffeinated fluid daily \par * Sitz Bathes BID, No: Anusol HC Suppos/Cream SC BID, \par * No: Tucks BID, No Balneol Lotion, No: Calmoseptine Oint, +++ Prep H prn \par * No: Colorectal Surgical evaluation for possible ablation.   \par \par \par \par

## 2019-10-27 LAB
BASOPHILS # BLD AUTO: 0.08 K/UL
BASOPHILS NFR BLD AUTO: 0.7 %
CRP SERPL-MCNC: 0.62 MG/DL
EOSINOPHIL # BLD AUTO: 0.09 K/UL
EOSINOPHIL NFR BLD AUTO: 0.8 %
ERYTHROCYTE [SEDIMENTATION RATE] IN BLOOD BY WESTERGREN METHOD: 42 MM/HR
HCT VFR BLD CALC: 44.6 %
HGB BLD-MCNC: 14.6 G/DL
IMM GRANULOCYTES NFR BLD AUTO: 1.2 %
LYMPHOCYTES # BLD AUTO: 2.51 K/UL
LYMPHOCYTES NFR BLD AUTO: 22.3 %
MAN DIFF?: NORMAL
MCHC RBC-ENTMCNC: 32.7 GM/DL
MCHC RBC-ENTMCNC: 33.3 PG
MCV RBC AUTO: 101.8 FL
MONOCYTES # BLD AUTO: 0.94 K/UL
MONOCYTES NFR BLD AUTO: 8.3 %
NEUTROPHILS # BLD AUTO: 7.53 K/UL
NEUTROPHILS NFR BLD AUTO: 66.7 %
PLATELET # BLD AUTO: 207 K/UL
RBC # BLD: 4.38 M/UL
RBC # FLD: 14.5 %
WBC # FLD AUTO: 11.28 K/UL

## 2019-11-08 ENCOUNTER — APPOINTMENT (OUTPATIENT)
Dept: GASTROENTEROLOGY | Facility: CLINIC | Age: 64
End: 2019-11-08
Payer: MEDICAID

## 2019-11-08 VITALS
HEIGHT: 65 IN | DIASTOLIC BLOOD PRESSURE: 90 MMHG | BODY MASS INDEX: 38.99 KG/M2 | WEIGHT: 234 LBS | HEART RATE: 80 BPM | SYSTOLIC BLOOD PRESSURE: 132 MMHG

## 2019-11-08 PROCEDURE — 36415 COLL VENOUS BLD VENIPUNCTURE: CPT

## 2019-11-08 PROCEDURE — 99214 OFFICE O/P EST MOD 30 MIN: CPT | Mod: 25

## 2019-11-08 RX ORDER — INFLIXIMAB 100 MG/10ML
100 INJECTION, POWDER, LYOPHILIZED, FOR SOLUTION INTRAVENOUS
Qty: 1 | Refills: 0 | Status: ACTIVE | COMMUNITY
Start: 2019-11-08

## 2019-11-08 NOTE — ASSESSMENT
[FreeTextEntry1] : \par Nausea: intermittent--resolved\par Assoc malaise, anorexia--better\par Regurg/ dyspepsia--better\par Hepatitis: TB is trending down, transaminases are normal\par 6 MP toxicity--d/c on 2/28\par Hep Serologies are neg; amylase/lipase--wnl\par some  GERD\par \par \par P: anti-reflux, lactose free, low fat\par    Pepcid 40 bid--> bid  alt w 1 qd --> prn \par    No NDSAIDs/ETOH\par    Hold 6MP--> D/C \par \par \par \par \par \par 1. Ulcerative pancolitis without complication  : stable , No cramps, mucous/blood pr \par 10/5/17 w recent flare probably food borne, ESR=25, CRP=52--> 11/28/17: ESR=21, CRP=15-->12/21/17: ESR=22,CRP=39-->2/9/18: ESR=11, CRP<5 ->3/23/18: ESR=11,CRP=7-->\par 5/4/18: ESR=28, CRP=17-->7/31/18: ESR=38, CRP=9.7-->9/14/18: ESR=15, CRP<5-->10/12/18: ESR=11, CRP=6.2-->\par 11/9/18: ESR=13,  CRP=6--> 11/30/18: ESR=20, CRP=2.7--> 1/4/19: ESR=16, CRP=0.4-->2/8/19: ESR=20, CRP=0.7-->3/8/19: CRP=0.42--> 3/18/19: CRP=.14, ESR=36-->3/22/19: CRP=0.21, ESR=28--> 4/22/19: CRP=0.6, ESR=46--> 6/7/19: CRP=0.73, ESR=35-->7/9/19: CRP=.19, ESR=23-->7/23/19: CRP=0.11, ESR=10-->8/8/19: ESR=29-->9/10/19:ESR=18, CRP=0.27-->10/4/19:ESR=49, CRP=3.4 --> 10/10/19: ESR=34, CRP=0.86 --> 10/18/19: ESR=43,CRP=1.1, 10/25/19: ESR+42,CRP=0.6\par 10/18/19 Humira/ADA <1.6 ug/ml &  ROHITH Ab  level= 5.9 U/ml\par Mild-Moderate Pancolitis-10/2010\par 3/2013: Sigmoid Inflammatory Polyps\par 8/13/18 Colonoscopy: mild-mod proctosigmoiditis--patchy\par 6 MP 50 mg qd: 8/23/18--> 75mg qd on 9/14/18--> 75mg alt w 100mg on 9/28/18-->100mg on 10/12/18-->100mg alt w 125mg on 11/11/18--> 125mg qd on 11/30 /18-->125mg alt w 150 on 1/4/19--> 150mg--> d/c\par Will need Humira, once LFTs normalized but  Inflamm markers up:Load w 160, 80, 40 qow \par 160mg on 7/9/19, 80mg on 7/23/19, 40mg qow, took 10/4/19\par Discussed the risk and benefits including infections, lymphoma\par Uceris 9mg qd tapered off--5/2015, restarted 9mg qd 12/25/17--> 3/23/18: 1 qod-->6/11/19: 1 qd-->9/27/19 1 qod-->10/4/19: take 1 skip 2D-->  back to 9mg qd given elev markers & low ADA / + ROHITH Abs\par will inquire ins about switch to Remicade\par re check esr/crp \par Lialda 4 qd--> Apriso 3 grm 2/2 Ins--~2/2/18\par Flax seed Oil 3, Probiotic 3\par check labs--cbc,esr,crp,\par 5/4/18: TPMT =26.6\par Colonoscopy--surveillance: 8/2020.   \par Flu shot L delt 10/4/19\par \par \par \par \par 2. Diverticulosis of large intestine w/o  hemorrhage  :well controlled, no pain, infection,bleeding\par Discussed the potential complications of perforation, pain, infection, bleeding.\par Moderate -Fiber Diet was reviewed and emphasized.\par 6 - 8 cups of decaffeinated fluid daily.   \par \par \par \par \par 3. Irritable bowel syndrome with diarrhea : stable, no pain , no diarrhea, no bloat\par Moderate-Fiber diet, Low fat & lactose-free, low FODMAPS,\par increase fluid intake, reg exercise,\par Probiotics 3 daily \par \par \par \par \par 4. Gastroesophageal reflux disease w/o  esophagitis  :better w less  ht burn, No dysphagia, throat clear\par * No LPR, No Barretts w No Dysplasia , No Esophagitis grade: A \par *Anti-reflux diet and life-style changes emphasized. No Bedge. \par *Weight Reduction & reg exercise emphasized. \par * ++ PPI: Prilosec 20mg prn--> 40mg bid--> 40mg qam -->prn   , No H2B Q HS: , No Carafate 1gm: \par * No F/U EGD: (_ )mo. / (_ )yrs, \par * No pH Monitor, No Manometry, No esophagram \par * No ENT eval., No Surgical eval.   \par \par \par \par \par 5. Anemia due to chronic blood loss  & B12 Deficiency\par  stable, last Hgb in 7/19=15 , MZP=736\par B12<150\par MVI, FA 1qd, Slo-Fe 1 qd\par check cbc, ICA=185 Homocysteine=13, IF AB & Parietal cell Abs both neg\par supplement B12--> sc and then po \par B12: R delt: #1 9/10/19: 1cc,  #2- 9/20/19: 1cc, #3- 9/27/19: 1cc, #4-10/4/19: 1cc R, #5-10/10: 1cc, \par #6-10/18/19:1cc, #7-10/25/19: 1cc\par re check B12\par \par \par \par \par 6. Internal hemorrhoids  :well - controlled, no pain, swelling, itch, bleeding \par *Discussed the potential complications of thrombosis, pain, bleeding, swelling, itching, infection. \par *High-Fiber Diet was reviewed and emphasized. \par *6 -- 8 cups of decaffeinated fluid daily \par * Sitz Bathes BID, No: Anusol HC Suppos/Cream TX BID, \par * No: Tucks BID, No Balneol Lotion, No: Calmoseptine Oint, +++ Prep H prn \par * No: Colorectal Surgical evaluation for possible ablation.   \par \par \par \par

## 2019-11-08 NOTE — HISTORY OF PRESENT ILLNESS
[de-identified] : \par \par  PCP: elaina/daron \par \par        62 yo WM h/o HTN, Obesity, IBS, GERD\par        Adm PMHC 10/2010 w Crampy abd pain, Diarrhea, Heme +, Hgb=9, WBC=21K\par        CT ABD: Diffuse wall thickening\par        Colonoscopy: Moderate Pancolitis\par        Rx Prednisone 60mg qd taper, Asacol 4 tid\par        6/17/11 completed pred taper but continued Asacol 4 tid, anemia resolved\par        3/19/13 Colonoscopy: sigmoid divers, inflammatory polyps sigmoid colon\par        Asacol HD 3 tid + Rowasa q hs\par        Late Sept 2017: had fast food hamburger and several hrs later\par        Lower abd cramping, Loose BMs: #5, 2-3x/D, Blood in stool, No fever, chills\par        10/5/17: feeling better on Lialda 4 qd\par        BMS: 4-5, 2x/D, cramps and bleeding stopped\par        Labs: WBC=8.9, Hgb=15, MCV=90, IMGs=0.6%, ESR=25, CRP=52, CMET=wnl\par        11/28/17:on Lialda 4 qd, occas cramp, BMs:# 4-5, 2-3x/D, occas # 6\par        Labs: Hgb=15, ESR=21, CRP=15.8\par        12/21/17:yesterday pm, cramping, BMs: 5, 1-3 x/d, w blood and mucous\par        This am sl cramping, no blood but mucous\par        WBC=10, CRP=39, ESR=22\par        Uceris 9mg qd started on 12/25/17, Lialda--> Apriso 3grm 2/2 Ins coverage about 1 week ago\par        2/9/18: BMs: #4-5, 1-2 x D, ESR=11, CRP<5\par        3/23/18: BMs: #4, 2 x D,no pain brbpr, ESR=11, CRP=7.4\par        5/4/18: BMs: # 4 qd, no pain or blood, ESR=28, CRP=17\par        6/15/18: BMs:#4 qd, no pain or blood\par        7/31/18: was feeling well on Uceris qod, thinks had bug ~ 1 week ago w ? chill, sl cramping, 1 day brbpr/mucous then resolved.\par        ESR=38, CRP=9.9\par        8/13/18 Colonoscopy: mild-mod patchy proctosigmoiditis--worse distal rectum, mod sigmoid diverticulosis, 0.5cm rectal polyp: HP, 2nd deg Hemorrhoids.\par        8/23/18: 6 MP 50mg qd, 9/14/18: Hgb=14.9, MCV=94, ESR=15, CRP<5\par        9/28/18: 6MP 75mg qd, Hgb=16.7, MCV=96, ESR=11, CRP=6.2\par        11/9/18: 6MP 100mg qd, Hgb=16, MCV=94, ESR=13, CRP=6\par        11/30/18: 6MP 100mg qd alt w 125mg x  3 weeks, Hgb=15, MCV=96, ESR=20, CRP=2.7\par        1/4/19: 6MP 125mg \par         2/8/19: Hgb=15, BCP=899, ESR=20, CRP=0.7 on 6MP 125mg qd alt w 150 x 4 weeks \par         2/28/19:last wk some nausea, no pain, + malaise, no fever/chills/diarrhea, not hungry, \par        ate late the other night, awoke w throat burn, cough--> regurg\par        Also some Nausea/churning of stomach during the day--felt better w pepcid\par        No change in stool, no rash, jt pain, pruritus,new travel, recent contact w sick students--\par        BMs: # 4 qd, no blood or mucous;  6-MP held \par        Labs: TB=7.4, GYI=638, ALT=124,AST=90, CRP=0.42,ESR=9, Hgb=14\par        3/1/19: TB=9,XBB=054,ALT=139, ESR=33, NEREYDA,ASMA,AMA,HCV,HAV,HBV--all neg\par        3/8/19: Feeling better, less nausea, eating, TB=2.6, STV=331, ALT=213, KKE=846, CRP=.42\par        3/18/19: TB=1.2, FFI=705, ALT=111, AST=45, ESR=36, CRP=0.14\par        3/22/19: TB=0.9, ALP=85, ALT=70, AST=38, ESR=28, CRP=0.21;  BMs: # 4 qd, we=039\par        4/26/19: TB=0.5, ALP=58, ALT=25,AST=19, ESR=46, CRP=0.6, BMs: #4qd, an=742\par        6/7/19: ESR=35, CRP=0.73, Hgb=14, et=688, BMs: #4qd\par        7/9/19: ESR= 23  , CRP= .19   , Hgb=15.6, B12<150, & TSH, FA, Hep BsAb/Bc Ab, Quantiferon          all neg , ec=872, BMs: #4 ,. no bleeding \par        7/23/19:ESR=10, CRP=0.11, Hgb=14.5, Lm=978, BMs: #4qd\par        8/8/19: ESR=29, yh=000, BMs: #4 qd, no brbpr\par        9/10/19: ESR=18, CRP=0.27, Hgb=15, yw=037, BMS:# 4qd\par        10/4/19; ESR=49, CRP=3.4, Hgb=14, nu=281, BMs:# 4qd \par       10/10/19: ESR=34, CRP=0.86, kl=946,BMs:# 4qd , 1 1/2 weeks ago had ? bug, achey, low grade fever, no chills, sorethroat, cough\par        10/18/19: feeling well, fj=420,  ESR=43, CRP=1.1, ADA <1.6 ug/ml, ROHITH 5.9 U/ml\par        10/25/19: feeling well, zp=931,  ESR=42, CRP=.6\par \par Today: feeling well, no pain, N, diarrhea; but ESR=42 w ADA <1.6 ug/ml, ROHITH 5.9 U/ml\par       BMs: # 4 qd, wt=244___\par        blood-- no\par        mucous--no\par        Abd cramping-no\par        Diarrhea--no\par        Bloat-no \par        BRBPR-no\par        Htburn-no\par        Dysphagia-no \par        N/V--no\par        Early satiety--no\par        Belching--no\par        Constipation--no\par        Melena--no\par        Rash--no\par        Oral ulcers--no\par \par \par \par        PRIOR HISTORY:\par        12/20/13: 2 weeks diarrhea 4-5x/D, mild cramps, scant blood\par        WBC=29K, CRP=30, ESR=12, stool Cdiff--neg\par        Uceris 9, Asacol HD 3 tid\par        5/15/15: Uceris tapered off a week prior, Lialda 2/1/2; HGB=15.8, CRP=7, ESR=6\par        8/2015: Sinus infection\par        1/8/16 : Hgb=15.9, CRP=8.2, ESR=18, BMs:# 4qd, no pain, no brbpr\par        5/5/17:well on Lialda 4 qd , New Ins--dosent cover Lialda\par        BMs:# 4, 1-2x/D, CRP=10.6, ESR=23, CBC, CMET--wnl.

## 2019-11-09 LAB
BASOPHILS # BLD AUTO: 0.06 K/UL
BASOPHILS NFR BLD AUTO: 0.7 %
CRP SERPL-MCNC: 0.99 MG/DL
EOSINOPHIL # BLD AUTO: 0.21 K/UL
EOSINOPHIL NFR BLD AUTO: 2.4 %
ERYTHROCYTE [SEDIMENTATION RATE] IN BLOOD BY WESTERGREN METHOD: 42 MM/HR
HCT VFR BLD CALC: 42.9 %
HGB BLD-MCNC: 14.2 G/DL
IMM GRANULOCYTES NFR BLD AUTO: 0.9 %
LYMPHOCYTES # BLD AUTO: 1.73 K/UL
LYMPHOCYTES NFR BLD AUTO: 19.5 %
MAN DIFF?: NORMAL
MCHC RBC-ENTMCNC: 33.1 GM/DL
MCHC RBC-ENTMCNC: 33.2 PG
MCV RBC AUTO: 100.2 FL
MONOCYTES # BLD AUTO: 0.68 K/UL
MONOCYTES NFR BLD AUTO: 7.7 %
NEUTROPHILS # BLD AUTO: 6.11 K/UL
NEUTROPHILS NFR BLD AUTO: 68.8 %
PLATELET # BLD AUTO: 184 K/UL
RBC # BLD: 4.28 M/UL
RBC # FLD: 13.8 %
VIT B12 SERPL-MCNC: 432 PG/ML
WBC # FLD AUTO: 8.87 K/UL

## 2019-11-12 LAB — METHYLMALONATE SERPL-SCNC: 165 NMOL/L

## 2019-11-13 ENCOUNTER — RX RENEWAL (OUTPATIENT)
Age: 64
End: 2019-11-13

## 2019-12-06 ENCOUNTER — APPOINTMENT (OUTPATIENT)
Dept: GASTROENTEROLOGY | Facility: CLINIC | Age: 64
End: 2019-12-06
Payer: MEDICAID

## 2019-12-06 VITALS
HEART RATE: 82 BPM | WEIGHT: 234 LBS | HEIGHT: 65 IN | DIASTOLIC BLOOD PRESSURE: 86 MMHG | BODY MASS INDEX: 38.99 KG/M2 | SYSTOLIC BLOOD PRESSURE: 136 MMHG

## 2019-12-06 PROCEDURE — 36415 COLL VENOUS BLD VENIPUNCTURE: CPT

## 2019-12-06 PROCEDURE — 99214 OFFICE O/P EST MOD 30 MIN: CPT | Mod: 25

## 2019-12-06 NOTE — ASSESSMENT
[FreeTextEntry1] : \par Nausea: intermittent--resolved\par Assoc malaise, anorexia--better\par Regurg/ dyspepsia--better\par Hepatitis: TB is trending down, transaminases are normal\par 6 MP toxicity--d/c on 2/28\par Hep Serologies are neg; amylase/lipase--wnl\par some  GERD\par \par \par P: anti-reflux, lactose free, low fat\par    Pepcid 40 bid--> bid  alt w 1 qd --> prn \par    No NDSAIDs/ETOH\par    Hold 6MP--> D/C \par \par \par \par \par \par 1. Ulcerative pancolitis without complication  : stable , No cramps, mucous/blood pr \par 10/5/17 w recent flare probably food borne, ESR=25, CRP=52--> 11/28/17: ESR=21, CRP=15-->12/21/17: ESR=22,CRP=39-->2/9/18: ESR=11, CRP<5 ->3/23/18: ESR=11,CRP=7-->\par 5/4/18: ESR=28, CRP=17-->7/31/18: ESR=38, CRP=9.7-->9/14/18: ESR=15, CRP<5-->10/12/18: ESR=11, CRP=6.2-->\par 11/9/18: ESR=13,  CRP=6--> 11/30/18: ESR=20, CRP=2.7--> 1/4/19: ESR=16, CRP=0.4-->2/8/19: ESR=20, CRP=0.7-->3/8/19: CRP=0.42--> 3/18/19: CRP=.14, ESR=36-->3/22/19: CRP=0.21, ESR=28--> 4/22/19: CRP=0.6, ESR=46--> 6/7/19: CRP=0.73, ESR=35-->7/9/19: CRP=.19, ESR=23-->7/23/19: CRP=0.11, ESR=10-->8/8/19: ESR=29-->9/10/19:ESR=18, CRP=0.27-->10/4/19:ESR=49, CRP=3.4 --> 10/10/19: ESR=34, CRP=0.86 --> 10/18/19: ESR=43,CRP=1.1, 10/25/19: ESR+42,CRP=0.6, 11/8/19: ESR=42, CRP=0.99\par 10/18/19 Humira/ADA <1.6 ug/ml &  ROHITH Ab  level= 5.9 U/ml\par Mild-Moderate Pancolitis-10/2010\par 3/2013: Sigmoid Inflammatory Polyps\par 8/13/18 Colonoscopy: mild-mod proctosigmoiditis--patchy\par 6 MP 50 mg qd: 8/23/18--> 75mg qd on 9/14/18--> 75mg alt w 100mg on 9/28/18-->100mg on 10/12/18-->100mg alt w 125mg on 11/11/18--> 125mg qd on 11/30 /18-->125mg alt w 150 on 1/4/19--> 150mg--> d/c\par Will need Humira, once LFTs normalized but  Inflamm markers up:Load w 160, 80, 40 qow \par 160mg on 7/9/19, 80mg on 7/23/19, 40mg qow, took 10/4/19\par Discussed the risk and benefits including infections, lymphoma\par Uceris 9mg qd tapered off--5/2015, restarted 9mg qd 12/25/17--> 3/23/18: 1 qod-->6/11/19: 1 qd-->9/27/19 1 qod-->10/4/19: take 1 skip 2D-->  back to 9mg qd given elev markers & low ADA / + ROHITH Abs\par Ins approved Remicade--hospital did call to sched loading dose--pt to sched \par re check esr/crp \par Lialda 4 qd--> Apriso 3 grm 2/2 Ins--~2/2/18\par Flax seed Oil 3, Probiotic 3\par check labs--cbc,esr,crp,\par 5/4/18: TPMT =26.6\par Colonoscopy--surveillance: 8/2020.   \par Flu shot L delt 10/4/19\par \par \par \par \par 2. Diverticulosis of large intestine w/o  hemorrhage  :well controlled, no pain, infection,bleeding\par Discussed the potential complications of perforation, pain, infection, bleeding.\par Moderate -Fiber Diet was reviewed and emphasized.\par 6 - 8 cups of decaffeinated fluid daily.   \par \par \par \par \par 3. Irritable bowel syndrome with diarrhea : stable, no pain , no diarrhea, no bloat\par Moderate-Fiber diet, Low fat & lactose-free, low FODMAPS,\par increase fluid intake, reg exercise,\par Probiotics 3 daily \par \par \par \par \par 4. Gastroesophageal reflux disease w/o  esophagitis  :better w less  ht burn, No dysphagia, throat clear\par * No LPR, No Barretts w No Dysplasia , No Esophagitis grade: A \par *Anti-reflux diet and life-style changes emphasized. No Bedge. \par *Weight Reduction & reg exercise emphasized. \par * ++ PPI: Prilosec 20mg prn--> 40mg bid--> 40mg qam -->prn   , No H2B Q HS: , No Carafate 1gm: \par * No F/U EGD: (_ )mo. / (_ )yrs, \par * No pH Monitor, No Manometry, No esophagram \par * No ENT eval., No Surgical eval.   \par \par \par \par \par 5. Anemia due to chronic blood loss  & B12 Deficiency\par  stable, 7/9/19: B12<150, MYA=740\par MVI, FA 1qd, Slo-Fe 1 qd\par check cbc, WDO=104 Homocysteine=13, IF AB & Parietal cell Abs both neg\par supplement B12--> sc and then po SL qd \par B12: R delt: #1 9/10/19: 1cc,  #2- 9/20/19: 1cc, #3- 9/27/19: 1cc, #4-10/4/19: 1cc R, #5-10/10: 1cc, \par #6-10/18/19:1cc, #7-10/25/19: 1cc\par 11/8/19 O11=602, AAI=286,  recheck in 3 mo on po\par \par \par \par \par 6. Internal hemorrhoids  :well - controlled, no pain, swelling, itch, bleeding \par *Discussed the potential complications of thrombosis, pain, bleeding, swelling, itching, infection. \par *High-Fiber Diet was reviewed and emphasized. \par *6 -- 8 cups of decaffeinated fluid daily \par * Sitz Bathes BID, No: Anusol HC Suppos/Cream PA BID, \par * No: Tucks BID, No Balneol Lotion, No: Calmoseptine Oint, +++ Prep H prn \par * No: Colorectal Surgical evaluation for possible ablation.   \par \par \par \par

## 2019-12-06 NOTE — HISTORY OF PRESENT ILLNESS
[de-identified] : \par \par  PCP: elaina/daron \par \par        62 yo WM h/o HTN, Obesity, IBS, GERD\par        Adm [facility]HC 10/2010 w Crampy abd pain, Diarrhea, Heme +, Hgb=9, WBC=21K\par        CT ABD: Diffuse wall thickening\par        Colonoscopy: Moderate Pancolitis\par        Rx Prednisone 60mg qd taper, Asacol 4 tid\par        6/17/11 completed pred taper but continued Asacol 4 tid, anemia resolved\par        3/19/13 Colonoscopy: sigmoid divers, inflammatory polyps sigmoid colon\par        Asacol HD 3 tid + Rowasa q hs\par        Late Sept 2017: had fast food hamburger and several hrs later\par        Lower abd cramping, Loose BMs: #5, 2-3x/D, Blood in stool, No fever, chills\par        10/5/17: feeling better on Lialda 4 qd\par        BMS: 4-5, 2x/D, cramps and bleeding stopped\par        Labs: WBC=8.9, Hgb=15, MCV=90, IMGs=0.6%, ESR=25, CRP=52, CMET=wnl\par        11/28/17:on Lialda 4 qd, occas cramp, BMs:# 4-5, 2-3x/D, occas # 6\par        Labs: Hgb=15, ESR=21, CRP=15.8\par        12/21/17:yesterday pm, cramping, BMs: 5, 1-3 x/d, w blood and mucous\par        This am sl cramping, no blood but mucous\par        WBC=10, CRP=39, ESR=22\par        Uceris 9mg qd started on 12/25/17, Lialda--> Apriso 3grm 2/2 Ins coverage about 1 week ago\par        2/9/18: BMs: #4-5, 1-2 x D, ESR=11, CRP<5\par        3/23/18: BMs: #4, 2 x D,no pain brbpr, ESR=11, CRP=7.4\par        5/4/18: BMs: # 4 qd, no pain or blood, ESR=28, CRP=17\par        6/15/18: BMs:#4 qd, no pain or blood\par        7/31/18: was feeling well on Uceris qod, thinks had bug ~ 1 week ago w ? chill, sl cramping, 1 day brbpr/mucous then resolved.\par        ESR=38, CRP=9.9\par        8/13/18 Colonoscopy: mild-mod patchy proctosigmoiditis--worse distal rectum, mod sigmoid diverticulosis, 0.5cm rectal polyp: HP, 2nd deg Hemorrhoids.\par        8/23/18: 6 MP 50mg qd, 9/14/18: Hgb=14.9, MCV=94, ESR=15, CRP<5\par        9/28/18: 6MP 75mg qd, Hgb=16.7, MCV=96, ESR=11, CRP=6.2\par        11/9/18: 6MP 100mg qd, Hgb=16, MCV=94, ESR=13, CRP=6\par        11/30/18: 6MP 100mg qd alt w 125mg x  3 weeks, Hgb=15, MCV=96, ESR=20, CRP=2.7\par        1/4/19: 6MP 125mg \par         2/8/19: Hgb=15, UPY=327, ESR=20, CRP=0.7 on 6MP 125mg qd alt w 150 x 4 weeks \par         2/28/19:last wk some nausea, no pain, + malaise, no fever/chills/diarrhea, not hungry, \par        ate late the other night, awoke w throat burn, cough--> regurg\par        Also some Nausea/churning of stomach during the day--felt better w pepcid\par        No change in stool, no rash, jt pain, pruritus,new travel, recent contact w sick students--\par        BMs: # 4 qd, no blood or mucous;  6-MP held \par        Labs: TB=7.4, AFJ=206, ALT=124,AST=90, CRP=0.42,ESR=9, Hgb=14\par        3/1/19: TB=9,RSC=681,ALT=139, ESR=33, NEREYDA,ASMA,AMA,HCV,HAV,HBV--all neg\par        3/8/19: Feeling better, less nausea, eating, TB=2.6, VCL=634, ALT=213, EWI=129, CRP=.42\par        3/18/19: TB=1.2, PHI=882, ALT=111, AST=45, ESR=36, CRP=0.14\par        3/22/19: TB=0.9, ALP=85, ALT=70, AST=38, ESR=28, CRP=0.21;  BMs: # 4 qd, zm=010\par        4/26/19: TB=0.5, ALP=58, ALT=25,AST=19, ESR=46, CRP=0.6, BMs: #4qd, vx=496\par        6/7/19: ESR=35, CRP=0.73, Hgb=14, mb=048, BMs: #4qd\par        7/9/19: ESR= 23  , CRP= .19   , Hgb=15.6, B12<150, & TSH, FA, Hep BsAb/Bc Ab, Quantiferon          all neg , pb=157, BMs: #4 ,. no bleeding \par        7/23/19:ESR=10, CRP=0.11, Hgb=14.5, Hy=155, BMs: #4qd\par        8/8/19: ESR=29, ia=558, BMs: #4 qd, no brbpr\par        9/10/19: ESR=18, CRP=0.27, Hgb=15, fy=431, BMS:# 4qd\par        10/4/19; ESR=49, CRP=3.4, Hgb=14, zd=898, BMs:# 4qd \par       10/10/19: ESR=34, CRP=0.86, az=065,BMs:# 4qd , 1 1/2 weeks ago had ? bug, achey, low grade fever, no chills, sorethroat, cough\par        10/18/19: feeling well, rv=004,  ESR=43, CRP=1.1, ADA <1.6 ug/ml, ROHITH 5.9 U/ml\par        10/25/19: feeling well, ra=784,  ESR=42, CRP=.6\par        11/8/19:  BMs: # 4 qd, de=854, ESR=42, CRP=0.99\par \par Today: feeling well, no pain, N, diarrhea; has not scheduled his Remicade yet--busy at school\par       BMs: # 4 qd, wt=246___\par        blood-- no\par        mucous--no\par        Abd cramping-no\par        Diarrhea--no\par        Bloat-no \par        BRBPR-no\par        Htburn-no\par        Dysphagia-no \par        N/V--no\par        Early satiety--no\par        Belching--no\par        Constipation--no\par        Melena--no\par        Rash--no\par        Oral ulcers--no\par \par \par \par        PRIOR HISTORY:\par        12/20/13: 2 weeks diarrhea 4-5x/D, mild cramps, scant blood\par        WBC=29K, CRP=30, ESR=12, stool Cdiff--neg\par        Uceris 9, Asacol HD 3 tid\par        5/15/15: Uceris tapered off a week prior, Lialda 2/1/2; HGB=15.8, CRP=7, ESR=6\par        8/2015: Sinus infection\par        1/8/16 : Hgb=15.9, CRP=8.2, ESR=18, BMs:# 4qd, no pain, no brbpr\par        5/5/17:well on Lialda 4 qd , New Ins--dosent cover Lialda\par        BMs:# 4, 1-2x/D, CRP=10.6, ESR=23, CBC, CMET--wnl.

## 2019-12-08 LAB
ALBUMIN SERPL ELPH-MCNC: 4.1 G/DL
ALP BLD-CCNC: 49 U/L
ALT SERPL-CCNC: 23 U/L
ANION GAP SERPL CALC-SCNC: 15 MMOL/L
AST SERPL-CCNC: 15 U/L
BASOPHILS # BLD AUTO: 0.05 K/UL
BASOPHILS NFR BLD AUTO: 0.5 %
BILIRUB SERPL-MCNC: 0.4 MG/DL
BUN SERPL-MCNC: 21 MG/DL
CALCIUM SERPL-MCNC: 9.3 MG/DL
CHLORIDE SERPL-SCNC: 103 MMOL/L
CO2 SERPL-SCNC: 26 MMOL/L
CREAT SERPL-MCNC: 1.03 MG/DL
CRP SERPL-MCNC: 0.21 MG/DL
EOSINOPHIL # BLD AUTO: 0.02 K/UL
EOSINOPHIL NFR BLD AUTO: 0.2 %
ERYTHROCYTE [SEDIMENTATION RATE] IN BLOOD BY WESTERGREN METHOD: 32 MM/HR
GLUCOSE SERPL-MCNC: 166 MG/DL
HCT VFR BLD CALC: 46.5 %
HGB BLD-MCNC: 15.4 G/DL
IMM GRANULOCYTES NFR BLD AUTO: 1.1 %
LYMPHOCYTES # BLD AUTO: 1.21 K/UL
LYMPHOCYTES NFR BLD AUTO: 12.4 %
MAN DIFF?: NORMAL
MCHC RBC-ENTMCNC: 33.1 GM/DL
MCHC RBC-ENTMCNC: 33.2 PG
MCV RBC AUTO: 100.2 FL
MONOCYTES # BLD AUTO: 0.56 K/UL
MONOCYTES NFR BLD AUTO: 5.8 %
NEUTROPHILS # BLD AUTO: 7.78 K/UL
NEUTROPHILS NFR BLD AUTO: 80 %
PLATELET # BLD AUTO: 178 K/UL
POTASSIUM SERPL-SCNC: 4 MMOL/L
PROT SERPL-MCNC: 6.5 G/DL
RBC # BLD: 4.64 M/UL
RBC # FLD: 13.9 %
SODIUM SERPL-SCNC: 144 MMOL/L
WBC # FLD AUTO: 9.73 K/UL

## 2020-01-05 ENCOUNTER — RX RENEWAL (OUTPATIENT)
Age: 65
End: 2020-01-05

## 2020-01-13 ENCOUNTER — RX RENEWAL (OUTPATIENT)
Age: 65
End: 2020-01-13

## 2020-05-01 RX ORDER — MESALAMINE 375 MG/1
0.38 CAPSULE, EXTENDED RELEASE ORAL
Qty: 240 | Refills: 5 | Status: ACTIVE | COMMUNITY
Start: 2019-03-27 | End: 1900-01-01

## 2021-01-17 ENCOUNTER — NON-APPOINTMENT (OUTPATIENT)
Age: 66
End: 2021-01-17

## 2021-01-19 ENCOUNTER — APPOINTMENT (OUTPATIENT)
Dept: GASTROENTEROLOGY | Facility: CLINIC | Age: 66
End: 2021-01-19
Payer: MEDICAID

## 2021-01-19 VITALS
DIASTOLIC BLOOD PRESSURE: 90 MMHG | OXYGEN SATURATION: 98 % | BODY MASS INDEX: 37.99 KG/M2 | SYSTOLIC BLOOD PRESSURE: 130 MMHG | HEART RATE: 48 BPM | HEIGHT: 65 IN | TEMPERATURE: 97.7 F | WEIGHT: 228 LBS

## 2021-01-19 DIAGNOSIS — K57.30 DIVERTICULOSIS OF LARGE INTESTINE W/OUT PERFORATION OR ABSCESS W/OUT BLEEDING: ICD-10-CM

## 2021-01-19 DIAGNOSIS — K64.8 OTHER HEMORRHOIDS: ICD-10-CM

## 2021-01-19 DIAGNOSIS — R94.5 ABNORMAL RESULTS OF LIVER FUNCTION STUDIES: ICD-10-CM

## 2021-01-19 DIAGNOSIS — D50.0 IRON DEFICIENCY ANEMIA SECONDARY TO BLOOD LOSS (CHRONIC): ICD-10-CM

## 2021-01-19 DIAGNOSIS — K51.00 ULCERATIVE (CHRONIC) PANCOLITIS W/OUT COMPLICATIONS: ICD-10-CM

## 2021-01-19 DIAGNOSIS — K21.9 GASTRO-ESOPHAGEAL REFLUX DISEASE W/OUT ESOPHAGITIS: ICD-10-CM

## 2021-01-19 DIAGNOSIS — D51.9 VITAMIN B12 DEFICIENCY ANEMIA, UNSPECIFIED: ICD-10-CM

## 2021-01-19 DIAGNOSIS — K58.0 IRRITABLE BOWEL SYNDROME WITH DIARRHEA: ICD-10-CM

## 2021-01-19 PROCEDURE — 99072 ADDL SUPL MATRL&STAF TM PHE: CPT

## 2021-01-19 PROCEDURE — 99215 OFFICE O/P EST HI 40 MIN: CPT | Mod: 25

## 2021-01-19 RX ORDER — ADALIMUMAB 80MG/0.8ML
80 KIT SUBCUTANEOUS
Qty: 1 | Refills: 0 | Status: DISCONTINUED | COMMUNITY
Start: 2019-06-11 | End: 2021-01-19

## 2021-01-19 NOTE — HISTORY OF PRESENT ILLNESS
[de-identified] : \par \par  PCP: elaina/daron \par \par        66 yo WM h/o HTN, Obesity, IBS, GERD\par        Adm [facility]HC 10/2010 w Crampy abd pain, Diarrhea, Heme +, Hgb=9, WBC=21K\par        CT ABD: Diffuse wall thickening\par        Colonoscopy: Moderate Pancolitis\par        Rx Prednisone 60mg qd taper, Asacol 4 tid\par        6/17/11 completed pred taper but continued Asacol 4 tid, anemia resolved\par        3/19/13 Colonoscopy: sigmoid divers, inflammatory polyps sigmoid colon\par        Asacol HD 3 tid + Rowasa q hs\par        Late Sept 2017: had fast food hamburger and several hrs later\par        Lower abd cramping, Loose BMs: #5, 2-3x/D, Blood in stool, No fever, chills\par        10/5/17: feeling better on Lialda 4 qd\par        BMS: 4-5, 2x/D, cramps and bleeding stopped\par        Labs: WBC=8.9, Hgb=15, MCV=90, IMGs=0.6%, ESR=25, CRP=52, CMET=wnl\par        11/28/17:on Lialda 4 qd, occas cramp, BMs:# 4-5, 2-3x/D, occas # 6\par        Labs: Hgb=15, ESR=21, CRP=15.8\par        12/21/17:yesterday pm, cramping, BMs: 5, 1-3 x/d, w blood and mucous\par        This am sl cramping, no blood but mucous\par        WBC=10, CRP=39, ESR=22\par        Uceris 9mg qd started on 12/25/17, Lialda--> Apr[employer] 3grm 2/2 Ins coverage about 1 week ago\par        2/9/18: BMs: #4-5, 1-2 x D, ESR=11, CRP<5\par        3/23/18: BMs: #4, 2 x D,no pain brbpr, ESR=11, CRP=7.4\par        5/4/18: BMs: # 4 qd, no pain or blood, ESR=28, CRP=17\par        6/15/18: BMs:#4 qd, no pain or blood\par        7/31/18: was feeling well on Uceris qod, thinks had bug ~ 1 week ago w ? chill, sl cramping, 1 day brbpr/mucous then resolved.\par        ESR=38, CRP=9.9\par        8/13/18 Colonoscopy: mild-mod patchy proctosigmoiditis--worse distal rectum, mod sigmoid diverticulosis, 0.5cm rectal polyp: HP, 2nd deg Hemorrhoids.\par        8/23/18: 6 MP 50mg qd, 9/14/18: Hgb=14.9, MCV=94, ESR=15, CRP<5\par        9/28/18: 6MP 75mg qd, Hgb=16.7, MCV=96, ESR=11, CRP=6.2\par        11/9/18: 6MP 100mg qd, Hgb=16, MCV=94, ESR=13, CRP=6\par        11/30/18: 6MP 100mg qd alt w 125mg x  3 weeks, Hgb=15, MCV=96, ESR=20, CRP=2.7\par        1/4/19: 6MP 125mg \par         2/8/19: Hgb=15, ZNE=448, ESR=20, CRP=0.7 on 6MP 125mg qd alt w 150 x 4 weeks \par         2/28/19:last wk some nausea, no pain, + malaise, no fever/chills/diarrhea, not hungry, \par        ate late the other night, awoke w throat burn, cough--> regurg\par        Also some Nausea/churning of stomach during the day--felt better w pepcid\par        No change in stool, no rash, jt pain, pruritus,new travel, recent contact w sick students--\par        BMs: # 4 qd, no blood or mucous;  6-MP held \par        Labs: TB=7.4, ILS=209, ALT=124,AST=90, CRP=0.42,ESR=9, Hgb=14\par        3/1/19: TB=9,VHI=524,ALT=139, ESR=33, NEREYDA,ASMA,AMA,HCV,HAV,HBV--all neg\par        3/8/19: Feeling better, less nausea, eating, TB=2.6, AXZ=399, ALT=213, CWU=586, CRP=.42\par        3/18/19: TB=1.2, AJH=246, ALT=111, AST=45, ESR=36, CRP=0.14\par        3/22/19: TB=0.9, ALP=85, ALT=70, AST=38, ESR=28, CRP=0.21;  BMs: # 4 qd, zd=217\par        4/26/19: TB=0.5, ALP=58, ALT=25,AST=19, ESR=46, CRP=0.6, BMs: #4qd, cp=024\par        6/7/19: ESR=35, CRP=0.73, Hgb=14, eu=972, BMs: #4qd\par        7/9/19: ESR= 23  , CRP= .19   , Hgb=15.6, B12<150, & TSH, FA, Hep BsAb/Bc Ab, Quantiferon          all neg , hn=579, BMs: #4 ,. no bleeding \par        7/23/19:ESR=10, CRP=0.11, Hgb=14.5, Xu=739, BMs: #4qd\par        8/8/19: ESR=29, es=306, BMs: #4 qd, no brbpr\par        9/10/19: ESR=18, CRP=0.27, Hgb=15, uo=268, BMS:# 4qd\par        10/4/19; ESR=49, CRP=3.4, Hgb=14, qs=542, BMs:# 4qd \par       10/10/19: ESR=34, CRP=0.86, tp=884,BMs:# 4qd , 1 1/2 weeks ago had ? bug, achey, low grade fever, no chills, sorethroat, cough\par        10/18/19: feeling well, so=133,  ESR=43, CRP=1.1, ADA <1.6 ug/ml, ROHITH 5.9 U/ml\par        10/25/19: feeling well, tt=496,  ESR=42, CRP=.6\par        11/8/19:  BMs: # 4 qd, st=219, ESR=42, CRP=0.99\par        12/6/19: never scheduled remicade ,BMs: # 4 qd, lv=873; ESR=32, CRP=0.21, Hgb=15\par \par \par Today: feeling well, no pain, N, diarrhea; has not scheduled his Remicade yet--busy at school\par      lost some wt during covid\par \par   Most recent labs  and imaging reviewed w patient:\par reviewed past endoscopic  findings and pathology in detail with patient\par patients questioned answered\par \par        BMs: # 4 qd \par        blood-- no\par        mucous--no\par        Abd cramping-no\par        Diarrhea--no\par        Bloat-no \par        BRBPR-no\par        Htburn-no\par        Dysphagia-no \par        N/V--no\par        Early satiety--no\par        Belching--no\par        Constipation--no\par        Melena--no\par        Rash--no\par        Oral ulcers--no\par \par \par \par        PRIOR HISTORY:\par        12/20/13: 2 weeks diarrhea 4-5x/D, mild cramps, scant blood\par        WBC=29K, CRP=30, ESR=12, stool Cdiff--neg\par        Uceris 9, Asacol HD 3 tid\par        5/15/15: Uceris tapered off a week prior, Lialda 2/1/2; HGB=15.8, CRP=7, ESR=6\par        8/2015: Sinus infection\par        1/8/16 : Hgb=15.9, CRP=8.2, ESR=18, BMs:# 4qd, no pain, no brbpr\par        5/5/17:well on Lialda 4 qd , New Ins--dosent cover Lialda\par        BMs:# 4, 1-2x/D, CRP=10.6, ESR=23, CBC, CMET--wnl.

## 2021-01-19 NOTE — ASSESSMENT
[FreeTextEntry1] : \par Nausea: intermittent--resolved\par Assoc malaise, anorexia--better\par Regurg/ dyspepsia--better\par Hepatitis: TB is trending down, transaminases are normal\par 6 MP toxicity--d/c on 2/28\par Hep Serologies are neg; amylase/lipase--wnl\par some  GERD\par \par \par P: anti-reflux, lactose free, low fat\par    Pepcid 40 bid--> bid  alt w 1 qd --> prn \par    No NDSAIDs/ETOH\par    Hold 6MP--> D/C \par \par \par \par \par \par 1. Ulcerative pancolitis without complication  : stable , No cramps, mucous/blood pr \par 10/5/17 w recent flare probably food borne, ESR=25, CRP=52--> 11/28/17: ESR=21, CRP=15-->12/21/17: ESR=22,CRP=39-->2/9/18: ESR=11, CRP<5 ->3/23/18: ESR=11,CRP=7-->\par 5/4/18: ESR=28, CRP=17-->7/31/18: ESR=38, CRP=9.7-->9/14/18: ESR=15, CRP<5-->10/12/18: ESR=11, CRP=6.2-->\par 11/9/18: ESR=13,  CRP=6--> 11/30/18: ESR=20, CRP=2.7--> 1/4/19: ESR=16, CRP=0.4-->2/8/19: ESR=20, CRP=0.7-->3/8/19: CRP=0.42--> 3/18/19: CRP=.14, ESR=36-->3/22/19: CRP=0.21, ESR=28--> 4/22/19: CRP=0.6, ESR=46--> 6/7/19: CRP=0.73, ESR=35-->7/9/19: CRP=.19, ESR=23-->7/23/19: CRP=0.11, ESR=10-->8/8/19: ESR=29-->9/10/19:ESR=18, CRP=0.27-->10/4/19:ESR=49, CRP=3.4 --> 10/10/19: ESR=34, CRP=0.86 --> 10/18/19: ESR=43,CRP=1.1, 10/25/19: ESR+42,CRP=0.6, 11/8/19: ESR=42, CRP=0.99; 12/6/19: ESR=32, CRP=0.21\par 10/18/19 Humira/ADA <1.6 ug/ml &  ROHITH Ab  level= 5.9 U/ml\par Mild-Moderate Pancolitis-10/2010\par 3/2013: Sigmoid Inflammatory Polyps\par 8/13/18 Colonoscopy: mild-mod proctosigmoiditis--patchy\par 6 MP 50 mg qd: 8/23/18--> 75mg qd on 9/14/18--> 75mg alt w 100mg on 9/28/18-->100mg on 10/12/18-->100mg alt w 125mg on 11/11/18--> 125mg qd on 11/30 /18-->125mg alt w 150 on 1/4/19--> 150mg--> d/c\par Will need Humira, once LFTs normalized but  Inflamm markers up:Load w 160, 80, 40 qow \par 160mg on 7/9/19, 80mg on 7/23/19, 40mg qow, took 10/4/19\par Discussed the risk and benefits including infections, lymphoma\par Uceris 9mg qd tapered off--5/2015, restarted 9mg qd 12/25/17--> 3/23/18: 1 qod-->6/11/19: 1 qd-->9/27/19 1 qod-->10/4/19: take 1 skip 2D-->  back to 9mg qd given elev markers & low ADA / + ROHITH Abs\par Ins approved Remicade--hospital did call to sched loading dose--pt never  scheduled\par \par Lialda 4 qd--> Apriso 3 grm 2/2 Ins--~2/2/2\par Flax seed Oil 3, Probiotic 3\par check labs--cbc,esr,crp,b12, iron profile, fa, cmet, haptoglobin, calprotectin \par 5/4/18: TPMT =26.6\par Colonoscopy--surveillance: 8/2020--due \par Flu shot and Covid # 1 has been received \par \par blood drawn in the office\par \par \par \par \par 2. Diverticulosis:  well - controlled.  No pain,  infection,  bleeding\par Recommendations\par * Discussed and reviewed the potential complications of perforation,  pain,  infection,  bleeding \par * High - Fiber Diet was reviewed and emphasized\par * Daily fluid intake was reviewed : 6  --  8 cups of decaffeinated fluid daily was emphasized \par \par \par \par    \par \par \par \par \par 3. Irritable Bowel Syndrome:  well - controlled.  No pain,  constipation,  diarrhea,  bloat\par Recommend: \par * Diet: High Fiber,  Low Fat & Lactose free, Low FODMAPs--was reviewed & emphasized\par * Daily fluid intake was reviewed : 6  --  8 cups of decaffeinated fluid daily was emphasized\par * Regular aerobic exercise was emphasized\par * Probiotics  1  daily\par * Miralax / Linzess/Amitiza--no required\par * Neuromodulation: reviewed but not required\par \par \par \par \par \par 4. GERD:  well  -  controlled,  no ht burn,  dysphagia,  throat clear\par * No LPR,  No Ngo’s w No Dysplasia,  No  Esophagitis grade: A--was found \par \par Recommend: \par * Anti-reflux diet & life-style changes reviewed & re-emphasized.  No  Bedge required\par * Weight reduction & regular exercise emphasized\par \par * ++  PPI:prilosec 20 mg prn  ,  No  H2B q HS:  ,  No Carafate  1 gram:   --was emphasized\par I reviewed & summarized the prospective randomized & retrospective non-randomized studies\par looking at potential long term SE's of PPIs, w special attention to associations & actual cause\par as related to GI infections, bone loss, cognitive changes, KD, Covid, vitamin & electrolyte deficiencies\par questions were answered, pt advised that PPIs should be used when needed as indicated by their\par clinical indication and response and tapering off is always the goal if possible. pt understood.\par \par * F/U  EGD:  [      ] mo.  /  [   2021  ] yr  --for Barretts screening / surveillance \par * No  need for pH Monitor,  No  Manometry,  No  Esophagram -- was emphasized \par * No  need for ENT  eval/F/U,  No  need for Surgical  eval  --  was emphasized \par \par  \par \par \par \par \par 5. Anemia due to chronic blood loss  & B12 Deficiency--stable\par  7/9/19: B12<150, PHW=305\par  KEJ=082 Homocysteine=13, IF AB & Parietal cell Abs both neg\par supplemented B12--> sc and then po SL qd \par B12: R delt: #1 9/10/19: 1cc,  #2- 9/20/19: 1cc, #3- 9/27/19: 1cc, #4-10/4/19: 1cc R, #5-10/10: 1cc, \par #6-10/18/19:1cc, #7-10/25/19: 1cc\par 11/8/19 H18=757, TYP=989,  \par 12/6/19: Hgb=15, WNE=663\par \par MVI, FA 1qd, Slo-Fe 1 qd\par recheck in 1/2021 was on po B12\par check cbc,b12, fa, iron profile \par \par blood drawn in the office\par \par \par \par \par \par 6. Hemorrhoids:  well - controlled.  No pain,  swelling,  itch,  bleeding\par * Discussed the potential complications of thrombosis,  pain,  infection,  swelling, itching,  bleeding \par Reccomend: \par * High - Fiber Diet was reviewed and emphasized\par * 6  --  8 cups of decaffeinated fluid daily was emphasized \par * Sitz Bathes BID,  No:  Anusol HC  Suppos / Cream  [place] BID -- was needed\par * No:  Tucks BID,   No:  Balneol Lotion,   No:  Calmoseptine Oint -- was needed ;    ++ Prep H prn\par * No:  need for  Colorectal surgical evaluation for possible ablation w Dr --  was emphasized\par \par   \par \par \par \par Informed Consent:\par * The risks & Benefits of Colonoscopy were discussed w patient.\par * This included but was not limited to perforation, bleeding, sedation /med rxns possibly requiring surgery, blood transfusions, antibiotics & CPR/Intubation.\par * Pt. understands & agrees to the procedures.\par The following instructions in regards to the prep and medically essential ( cardiac, pulmonary, sz, psych, endocrine)  pre-op medication administration\par was reviewed and emphasized with the patient . \par * Pt. advised to D/C  ASA/NSAIDs  7  Days  PTP .\par * [ +++ ]  Dulcolax / Miralax / Mag. Citrate ,  [     ] Prepopik/ Clenpiq ,  [     ] Osmo Prep,  [    ] GoLytely,  prep. reviewed w Pt.\par * Hold  [           ] AM of procedure.\par * Hold  [           ] PM of procedure.\par * Take  [           ] PM of procedure.\par * Take  [           ] AM of procedure.\par \par

## 2021-01-20 LAB
ALBUMIN SERPL ELPH-MCNC: 4 G/DL
ALP BLD-CCNC: 60 U/L
ALT SERPL-CCNC: 14 U/L
ANION GAP SERPL CALC-SCNC: 16 MMOL/L
AST SERPL-CCNC: 13 U/L
BASOPHILS # BLD AUTO: 0.06 K/UL
BASOPHILS NFR BLD AUTO: 0.5 %
BILIRUB SERPL-MCNC: 0.4 MG/DL
BUN SERPL-MCNC: 17 MG/DL
CALCIUM SERPL-MCNC: 9 MG/DL
CHLORIDE SERPL-SCNC: 101 MMOL/L
CO2 SERPL-SCNC: 23 MMOL/L
CREAT SERPL-MCNC: 1.15 MG/DL
CRP SERPL-MCNC: 1.06 MG/DL
EOSINOPHIL # BLD AUTO: 0.02 K/UL
EOSINOPHIL NFR BLD AUTO: 0.2 %
FERRITIN SERPL-MCNC: 141 NG/ML
FOLATE SERPL-MCNC: 6.8 NG/ML
GLUCOSE SERPL-MCNC: 165 MG/DL
HCT VFR BLD CALC: 46.9 %
HGB BLD-MCNC: 15.6 G/DL
IMM GRANULOCYTES NFR BLD AUTO: 1 %
IRON SERPL-MCNC: 82 UG/DL
LYMPHOCYTES # BLD AUTO: 1.1 K/UL
LYMPHOCYTES NFR BLD AUTO: 9.8 %
MAN DIFF?: NORMAL
MCHC RBC-ENTMCNC: 31.6 PG
MCHC RBC-ENTMCNC: 33.3 GM/DL
MCV RBC AUTO: 94.9 FL
MONOCYTES # BLD AUTO: 0.64 K/UL
MONOCYTES NFR BLD AUTO: 5.7 %
NEUTROPHILS # BLD AUTO: 9.31 K/UL
NEUTROPHILS NFR BLD AUTO: 82.8 %
PLATELET # BLD AUTO: 211 K/UL
POTASSIUM SERPL-SCNC: 3.8 MMOL/L
PROT SERPL-MCNC: 6.6 G/DL
RBC # BLD: 4.94 M/UL
RBC # FLD: 13.9 %
SODIUM SERPL-SCNC: 140 MMOL/L
VIT B12 SERPL-MCNC: 298 PG/ML
WBC # FLD AUTO: 11.24 K/UL

## 2021-02-03 LAB
ERYTHROCYTE [SEDIMENTATION RATE] IN BLOOD BY WESTERGREN METHOD: 34 MM/HR
HAPTOGLOB SERPL-MCNC: 237 MG/DL

## 2021-02-03 RX ORDER — HYDROCHLOROTHIAZIDE 25 MG/1
25 TABLET ORAL
Qty: 60 | Refills: 5 | Status: ACTIVE | COMMUNITY
Start: 2019-04-22 | End: 1900-01-01

## 2021-02-03 RX ORDER — BUDESONIDE 9 MG/1
9 TABLET, EXTENDED RELEASE ORAL DAILY
Qty: 30 | Refills: 5 | Status: ACTIVE | COMMUNITY
Start: 2019-06-03 | End: 1900-01-01

## 2021-03-25 ENCOUNTER — RX RENEWAL (OUTPATIENT)
Age: 66
End: 2021-03-25

## 2021-03-25 RX ORDER — MESALAMINE 0.38 G/1
0.38 CAPSULE, EXTENDED RELEASE ORAL
Qty: 240 | Refills: 5 | Status: ACTIVE | COMMUNITY
Start: 2020-05-01 | End: 1900-01-01